# Patient Record
Sex: FEMALE | Race: WHITE | NOT HISPANIC OR LATINO | Employment: OTHER | URBAN - METROPOLITAN AREA
[De-identification: names, ages, dates, MRNs, and addresses within clinical notes are randomized per-mention and may not be internally consistent; named-entity substitution may affect disease eponyms.]

---

## 2018-03-19 ENCOUNTER — OFFICE VISIT CONVERTED (OUTPATIENT)
Dept: ORTHOPEDIC SURGERY | Facility: CLINIC | Age: 73
End: 2018-03-19
Attending: PHYSICIAN ASSISTANT

## 2018-04-23 ENCOUNTER — OFFICE VISIT CONVERTED (OUTPATIENT)
Dept: ORTHOPEDIC SURGERY | Facility: CLINIC | Age: 73
End: 2018-04-23
Attending: PHYSICIAN ASSISTANT

## 2018-05-30 ENCOUNTER — OFFICE VISIT CONVERTED (OUTPATIENT)
Dept: ORTHOPEDIC SURGERY | Facility: CLINIC | Age: 73
End: 2018-05-30
Attending: PHYSICIAN ASSISTANT

## 2018-08-15 ENCOUNTER — OFFICE VISIT CONVERTED (OUTPATIENT)
Dept: ORTHOPEDIC SURGERY | Facility: CLINIC | Age: 73
End: 2018-08-15
Attending: PHYSICIAN ASSISTANT

## 2018-09-17 ENCOUNTER — OFFICE VISIT CONVERTED (OUTPATIENT)
Dept: ORTHOPEDIC SURGERY | Facility: CLINIC | Age: 73
End: 2018-09-17
Attending: PHYSICIAN ASSISTANT

## 2019-06-03 ENCOUNTER — OFFICE VISIT CONVERTED (OUTPATIENT)
Dept: ORTHOPEDIC SURGERY | Facility: CLINIC | Age: 74
End: 2019-06-03
Attending: PHYSICIAN ASSISTANT

## 2019-07-15 ENCOUNTER — OFFICE VISIT CONVERTED (OUTPATIENT)
Dept: ORTHOPEDIC SURGERY | Facility: CLINIC | Age: 74
End: 2019-07-15
Attending: PHYSICIAN ASSISTANT

## 2021-05-15 VITALS — WEIGHT: 204 LBS | HEART RATE: 66 BPM | BODY MASS INDEX: 37.54 KG/M2 | HEIGHT: 62 IN | OXYGEN SATURATION: 96 %

## 2021-05-15 VITALS — OXYGEN SATURATION: 96 % | HEIGHT: 62 IN | HEART RATE: 65 BPM | WEIGHT: 203.25 LBS | BODY MASS INDEX: 37.4 KG/M2

## 2021-05-16 VITALS — BODY MASS INDEX: 36.99 KG/M2 | WEIGHT: 201 LBS | HEIGHT: 62 IN | OXYGEN SATURATION: 98 % | HEART RATE: 77 BPM

## 2021-05-16 VITALS — HEART RATE: 86 BPM | BODY MASS INDEX: 36.99 KG/M2 | HEIGHT: 62 IN | WEIGHT: 201 LBS | OXYGEN SATURATION: 97 %

## 2021-05-16 VITALS — HEIGHT: 61 IN | WEIGHT: 205 LBS | BODY MASS INDEX: 38.71 KG/M2 | OXYGEN SATURATION: 98 % | HEART RATE: 86 BPM

## 2021-05-16 VITALS — OXYGEN SATURATION: 97 % | HEART RATE: 80 BPM | HEIGHT: 61 IN | WEIGHT: 204.5 LBS | BODY MASS INDEX: 38.61 KG/M2

## 2021-05-16 VITALS — BODY MASS INDEX: 37.36 KG/M2 | HEIGHT: 62 IN | OXYGEN SATURATION: 98 % | WEIGHT: 203 LBS | HEART RATE: 76 BPM

## 2021-09-01 ENCOUNTER — OFFICE VISIT (OUTPATIENT)
Dept: ORTHOPEDIC SURGERY | Facility: CLINIC | Age: 76
End: 2021-09-01

## 2021-09-01 VITALS — HEIGHT: 62 IN | BODY MASS INDEX: 38.53 KG/M2 | OXYGEN SATURATION: 97 % | HEART RATE: 68 BPM | WEIGHT: 209.4 LBS

## 2021-09-01 DIAGNOSIS — M70.51 PES ANSERINUS BURSITIS OF RIGHT KNEE: Primary | ICD-10-CM

## 2021-09-01 DIAGNOSIS — Z96.652 HISTORY OF TOTAL LEFT KNEE REPLACEMENT: ICD-10-CM

## 2021-09-01 DIAGNOSIS — Z96.651 HISTORY OF TOTAL RIGHT KNEE REPLACEMENT: ICD-10-CM

## 2021-09-01 PROCEDURE — 99213 OFFICE O/P EST LOW 20 MIN: CPT | Performed by: ORTHOPAEDIC SURGERY

## 2021-09-01 RX ORDER — DICLOFENAC SODIUM 75 MG/1
75 TABLET, DELAYED RELEASE ORAL EVERY 12 HOURS
COMMUNITY
Start: 2021-08-28 | End: 2023-02-06

## 2021-09-01 RX ORDER — TIZANIDINE 4 MG/1
4 TABLET ORAL EVERY 6 HOURS PRN
COMMUNITY
Start: 2021-08-26 | End: 2023-02-06

## 2021-09-01 RX ORDER — LEVOTHYROXINE SODIUM 75 UG/1
75 TABLET ORAL DAILY
COMMUNITY
Start: 2021-08-26 | End: 2023-02-06

## 2021-09-01 RX ORDER — LEVOTHYROXINE SODIUM 0.05 MG/1
TABLET ORAL
COMMUNITY

## 2021-09-01 RX ORDER — AMLODIPINE BESYLATE 10 MG/1
10 TABLET ORAL DAILY
COMMUNITY
Start: 2021-08-27

## 2021-09-01 RX ORDER — TRIAMCINOLONE ACETONIDE 1 MG/G
CREAM TOPICAL
COMMUNITY
Start: 2021-07-27 | End: 2023-02-06

## 2022-04-06 ENCOUNTER — HOSPITAL ENCOUNTER (OUTPATIENT)
Dept: GENERAL RADIOLOGY | Facility: HOSPITAL | Age: 77
Discharge: HOME | End: 2022-04-06

## 2022-04-06 ENCOUNTER — TRANSCRIBE ORDERS (OUTPATIENT)
Dept: GENERAL RADIOLOGY | Facility: HOSPITAL | Age: 77
End: 2022-04-06

## 2022-04-06 DIAGNOSIS — M25.532 LEFT WRIST PAIN: ICD-10-CM

## 2022-04-06 DIAGNOSIS — M79.642 LEFT HAND PAIN: Primary | ICD-10-CM

## 2022-04-06 DIAGNOSIS — M79.642 LEFT HAND PAIN: ICD-10-CM

## 2022-04-06 PROCEDURE — 73110 X-RAY EXAM OF WRIST: CPT

## 2022-04-06 PROCEDURE — 73130 X-RAY EXAM OF HAND: CPT

## 2023-01-08 ENCOUNTER — HOSPITAL ENCOUNTER (OUTPATIENT)
Dept: OTHER | Facility: HOSPITAL | Age: 78
Discharge: HOME | End: 2023-01-08

## 2023-02-06 ENCOUNTER — OFFICE VISIT (OUTPATIENT)
Dept: UROLOGY | Facility: CLINIC | Age: 78
End: 2023-02-06
Payer: MEDICARE

## 2023-02-06 VITALS — HEIGHT: 62 IN | WEIGHT: 272.6 LBS | BODY MASS INDEX: 50.16 KG/M2

## 2023-02-06 DIAGNOSIS — N20.0 KIDNEY STONE: Primary | ICD-10-CM

## 2023-02-06 LAB
BILIRUB BLD-MCNC: NEGATIVE MG/DL
CLARITY, POC: CLEAR
COLOR UR: YELLOW
EXPIRATION DATE: ABNORMAL
GLUCOSE UR STRIP-MCNC: NEGATIVE MG/DL
KETONES UR QL: NEGATIVE
LEUKOCYTE EST, POC: ABNORMAL
Lab: ABNORMAL
NITRITE UR-MCNC: NEGATIVE MG/ML
PH UR: 7 [PH] (ref 5–8)
PROT UR STRIP-MCNC: ABNORMAL MG/DL
RBC # UR STRIP: ABNORMAL /UL
SP GR UR: 1.02 (ref 1–1.03)
UROBILINOGEN UR QL: ABNORMAL

## 2023-02-06 PROCEDURE — 99203 OFFICE O/P NEW LOW 30 MIN: CPT | Performed by: UROLOGY

## 2023-02-06 PROCEDURE — 81003 URINALYSIS AUTO W/O SCOPE: CPT | Performed by: UROLOGY

## 2023-02-06 NOTE — PROGRESS NOTES
"Chief Complaint  Kidney stone    Subjective          Roopa Danielle presents to [facility]OLOG[facility]  History of Present Illness     The patient reports that she was experiencing pain when she was in the emergency room and also 2 days later. The patient notes she was told that she had an infection. She denies knowing when she pass any kidney stones. She states that when Dr. Coleman * [place] was there, he told her that she had one about the size of a grain of sand. She notes that she was experiencing severe pain, however; it subsided and she came to be seen in clinic. She denies being aware of passing any kidney stones.       Objective   Vital Signs:   Ht 157.5 cm (62\")   Wt 124 kg (272 lb 9.6 oz)   BMI 49.86 kg/m²       Physical Exam  Vitals and nursing note reviewed.   Constitutional:       Appearance: Normal appearance. She is well-developed.   Pulmonary:      Effort: Pulmonary effort is normal.      Breath sounds: Normal air entry.   Neurological:      Mental Status: She is alert and oriented to person, place, and time.      Motor: Motor function is intact.   Psychiatric:         Mood and Affect: Mood normal.         Behavior: Behavior normal.          Result Review :   The following data was reviewed by: Abena Hernandez MD on 02/06/2023:    Results for orders placed or performed in visit on 02/06/23   POC Urinalysis Dipstick, Automated    Specimen: Urine   Result Value Ref Range    Color Yellow Yellow, Straw, Dark Yellow, Anali    Clarity, UA Clear Clear    Specific Gravity  1.020 1.005 - 1.030    pH, Urine 7.0 5.0 - 8.0    Leukocytes Small (1+) (A) Negative    Nitrite, UA Negative Negative    Protein,  mg/dL (A) Negative mg/dL    Glucose, UA Negative Negative mg/dL    Ketones, UA Negative Negative    Urobilinogen, UA 0.2 E.U./dL Normal, 0.2 E.U./dL    Bilirubin Negative Negative    Blood, UA Moderate (A) Negative    Lot Number [id number]     Expiration Date 22,024          Data reviewed: Radiologic " studies CT scan from [facility] ED:   CT Abdomen Pelvis wo Contrast   Final Result     No acute intra-abdominal abnormalities identified.   Nonobstructing 5 mm stone in the lower pole of the left kidney. Tiny, 2 mm nonobstructing stone in the lower pole of the right kidney. No ureteral stone or renal obstruction.     Electronically Signed:   Donovan Vanessa MD   2023/01/08 at 23:46 CST   Reading Location ID and State: [id number] / [place]   Tel [phone], Service support [phone], Fax [phone]            Assessment and Plan    Diagnoses and all orders for this visit:    1. Kidney stone (Primary)  Assessment & Plan:  We will see her back with a KUB in 1 year or sooner if needed. She has two small stones, one in each kidney, a 5 mm and a 2 mm. No treatment needed at this time.      Orders:  -     POC Urinalysis Dipstick, Automated  -     XR Abdomen KUB; Future          Follow Up       No follow-ups on file.  Patient was given instructions and counseling regarding her condition or for health maintenance advice. Please see specific information pulled into the AVS if appropriate.     Transcribed from ambient dictation for Abena Hernandez MD by Shara Mcguire.  02/06/23   13:17 EST    Patient or patient representative verbalized consent to the visit recording.  I have personally performed the services described in this document as transcribed by the above individual, and it is both accurate and complete.  Abena Hernandez MD  2/7/2023  15:58 EST

## 2023-02-06 NOTE — ASSESSMENT & PLAN NOTE
We will see her back with a KUB in 1 year or sooner if needed. She has two small stones, one in each kidney, a 5 mm and a 2 mm. No treatment needed at this time.

## 2023-02-08 ENCOUNTER — PATIENT ROUNDING (BHMG ONLY) (OUTPATIENT)
Dept: SURGERY | Facility: CLINIC | Age: 78
End: 2023-02-08
Payer: MEDICARE

## 2023-02-08 NOTE — PROGRESS NOTES
February 8, 2023    Hello, may I speak with Roopa Danielle?    My name is Stephanie Yancey      I am  with [facility] JENNIFER CHAPA  [facility]  [address]  [place] [address] [phone].    Before we get started may I verify your date of birth? 1945    I am calling to officially welcome you to our practice and ask about your recent visit. Is this a good time to talk? yes    Tell me about your visit with us. What things went well?  Patient stated her appointment went well. She stated the staff and provider were all very nice.       We're always looking for ways to make our patients' experiences even better. Do you have recommendations on ways we may improve?  no    Overall were you satisfied with your first visit to our practice? yes       I appreciate you taking the time to speak with me today. Is there anything else I can do for you? no      Thank you, and have a great day.

## 2023-05-09 ENCOUNTER — HOSPITAL ENCOUNTER (OUTPATIENT)
Dept: GENERAL RADIOLOGY | Facility: HOSPITAL | Age: 78
Discharge: HOME | End: 2023-05-09
Payer: MEDICARE

## 2023-05-09 ENCOUNTER — TRANSCRIBE ORDERS (OUTPATIENT)
Dept: GENERAL RADIOLOGY | Facility: HOSPITAL | Age: 78
End: 2023-05-09
Payer: MEDICARE

## 2023-05-09 DIAGNOSIS — M25.561 RIGHT KNEE PAIN: ICD-10-CM

## 2023-05-09 DIAGNOSIS — M25.561 ACUTE PAIN OF RIGHT KNEE: ICD-10-CM

## 2023-05-09 DIAGNOSIS — M25.561 ACUTE PAIN OF RIGHT KNEE: Primary | ICD-10-CM

## 2023-05-09 PROCEDURE — 73560 X-RAY EXAM OF KNEE 1 OR 2: CPT

## 2023-05-10 ENCOUNTER — OFFICE VISIT (OUTPATIENT)
Dept: ORTHOPEDIC SURGERY | Facility: CLINIC | Age: 78
End: 2023-05-10
Payer: MEDICARE

## 2023-05-10 VITALS — BODY MASS INDEX: 50.05 KG/M2 | HEIGHT: 62 IN | WEIGHT: 272 LBS

## 2023-05-10 DIAGNOSIS — Z96.651 HISTORY OF TOTAL RIGHT KNEE REPLACEMENT: ICD-10-CM

## 2023-05-10 DIAGNOSIS — M25.561 RIGHT KNEE PAIN, UNSPECIFIED CHRONICITY: Primary | ICD-10-CM

## 2023-05-10 RX ORDER — LEVOTHYROXINE SODIUM 0.07 MG/1
1 TABLET ORAL DAILY
COMMUNITY
Start: 2023-03-28

## 2023-05-10 RX ORDER — MONTELUKAST SODIUM 10 MG/1
1 TABLET ORAL DAILY
COMMUNITY
Start: 2023-02-08

## 2023-05-10 RX ORDER — MELOXICAM 15 MG/1
15 TABLET ORAL DAILY
Qty: 30 TABLET | Refills: 0 | Status: SHIPPED | OUTPATIENT
Start: 2023-05-10

## 2023-05-10 RX ORDER — METHOCARBAMOL 500 MG/1
1 TABLET, FILM COATED ORAL EVERY 6 HOURS
COMMUNITY
Start: 2023-03-29

## 2023-05-10 RX ORDER — METHYLPREDNISOLONE 4 MG/1
1 TABLET ORAL DAILY
Qty: 21 TABLET | Refills: 0 | Status: SHIPPED | OUTPATIENT
Start: 2023-05-10

## 2023-05-10 RX ORDER — CHOLECALCIFEROL (VITAMIN D3) 1250 MCG
1 CAPSULE ORAL WEEKLY
COMMUNITY
Start: 2023-04-05

## 2023-05-10 RX ORDER — FELODIPINE 10 MG/1
1 TABLET, EXTENDED RELEASE ORAL DAILY
COMMUNITY
Start: 2023-05-05

## 2023-05-10 RX ORDER — CYCLOBENZAPRINE HCL 10 MG
1 TABLET ORAL 3 TIMES DAILY
COMMUNITY
Start: 2023-03-28

## 2023-05-10 RX ORDER — OMEPRAZOLE 40 MG/1
1 CAPSULE, DELAYED RELEASE ORAL DAILY
COMMUNITY
Start: 2023-02-08

## 2023-05-10 RX ORDER — LOSARTAN POTASSIUM 50 MG/1
1 TABLET ORAL DAILY
COMMUNITY
Start: 2023-03-28

## 2023-05-10 NOTE — PROGRESS NOTES
"Chief Complaint  Initial Evaluation of the Right Knee     Subjective      Roopa Danielle presents to [facility] for initial evaluation of the right knee. She has had pain for about 2 weeks.  She states she was walking on uneven ground and felt her knee give way a couple times.  She is here to make sure there is no damage to her old right total knee arthroplasty.      Allergies   Allergen Reactions   • Ibuprofen Unknown - Low Severity        Social History     Socioeconomic History   • Marital status:    Tobacco Use   • Smoking status: Never   • Smokeless tobacco: Never   Vaping Use   • Vaping Use: Never used   Substance and Sexual Activity   • Alcohol use: Never   • Drug use: Never        Review of Systems     Objective   Vital Signs:   Ht 157.5 cm (62\")   Wt 123 kg (272 lb)   BMI 49.75 kg/m²       Physical Exam  Constitutional:       Appearance: Normal appearance. Patient is well-developed and normal weight.   HENT:      Head: Normocephalic.      Right Ear: Hearing and external ear normal.      Left Ear: Hearing and external ear normal.      Nose: Nose normal.   Eyes:      Conjunctiva/sclera: Conjunctivae normal.   Cardiovascular:      Rate and Rhythm: Normal rate.   Pulmonary:      Effort: Pulmonary effort is normal.      Breath sounds: No wheezing or rales.   Abdominal:      Palpations: Abdomen is soft.      Tenderness: There is no abdominal tenderness.   Musculoskeletal:      Cervical back: Normal range of motion.   Skin:     Findings: No rash.   Neurological:      Mental Status: Patient  is alert and oriented to person, place, and time.   Psychiatric:         Mood and Affect: Mood and affect normal.         Judgment: Judgment normal.       Ortho Exam      RIGHT KNEE Flexion 120. Extension 0. Stable to varus/valgus stress. Stable to anterior/posterior drawer. Neurovascularly intact. Negative Polly. Negative Lachman. Positive EHL, FHL, HS and TA. Sensation intact to light " touch all 5 nerves of the foot. Ambulates with Antalgic gait. Patella is well tracking. Calf supple, non-tender. Positive tenderness to the medial joint line. Positive tenderness to the lateral joint line. Negative Crepitus. Good strength to hamstrings, quadriceps, dorsiflexors, and plantar flexors.  Knee Extensor Mechanism intact  No joint swelling.          Procedures        Imaging Results (Most Recent)     None           Result Review :       XR Knee 1 or 2 View Right    Result Date: 5/9/2023  Narrative: PROCEDURE: XR KNEE 1 OR 2 VW RIGHT  COMPARISON: [facility] , , XR KNEE 3 VW RIGHT, 9/01/2021, 9:52.  INDICATIONS: RIGHT KNEE PAIN FOR 2 WEEKS. NO INJURY. REPLACED IN 2018.  FINDINGS:  There are postoperative changes of right total knee arthroplasty.  The prosthesis remains in appropriate position and alignment.  The adjacent bony structures are unremarkable.      Impression:   1. Stable appearance following right total knee arthroplasty.  No acute findings.      Travis Nicole MD       Electronically Signed and Approved By: Travis Nicole MD on 5/09/2023 at 14:12             XR Knee Standing Right    Result Date: 5/9/2023  Narrative: PROCEDURE: XR KNEE STANDING RIGHT  COMPARISON: [facility], , XR KNEE 1 OR 2 VW RIGHT, 5/09/2023, 13:25.  INDICATIONS: RIGHT KNEE PAIN FOR 2 WEEKS. NO INJURY. REPLACED IN 2018.  FINDINGS:  A single weight-bearing view is obtained and compared directly to the radiographs obtained earlier on 5/9/2023.  The alignment remains normal.      Impression:  Normal alignment with weight-bearing      Travis Nicole MD       Electronically Signed and Approved By: Travis Nicole MD on 5/09/2023 at 14:12                      Assessment and Plan     Diagnoses and all orders for this visit:    1. Right knee pain, unspecified chronicity (Primary)  -     methylPREDNISolone (MEDROL) 4 MG dose pack; Take 1 tablet by mouth Daily. Use as directed by package instructions   Dispense: 21 tablet; Refill: 0  -     meloxicam (Mobic) 15 MG tablet; Take 1 tablet by mouth Daily.  Dispense: 30 tablet; Refill: 0    2. History of total right knee replacement        Discussed the treatment plan with the patient. I reviewed the X-rays that were obtained 5/9/23 with the patient.     Prescribed steroid dose pack        Call or return if worsening symptoms.    Follow Up     3 weeks.       Patient was given instructions and counseling regarding her condition or for health maintenance advice. Please see specific information pulled into the AVS if appropriate.     Scribed for Carlos A Hu MD by uLzma Page MA.  05/10/23   11:13 EDT      I have personally performed the services described in this document as scribed by the above individual and it is both accurate and complete. Carlos A Hu MD 05/12/23   Answers for HPI/ROS submitted by the patient on 5/8/2023  Please describe your symptoms.: Right knee paln  Have you had these symptoms before?: Yes  How long have you been having these symptoms?: 1-2 weeks  Please list any medications you are currently taking for this condition.: None  Please describe any probable cause for these symptoms. : Dont know  What is the primary reason for your visit?: Other

## 2023-06-05 ENCOUNTER — OFFICE VISIT (OUTPATIENT)
Dept: ORTHOPEDIC SURGERY | Facility: CLINIC | Age: 78
End: 2023-06-05
Payer: MEDICARE

## 2023-06-05 VITALS — HEIGHT: 62 IN | BODY MASS INDEX: 50.05 KG/M2 | WEIGHT: 272 LBS

## 2023-06-05 DIAGNOSIS — Z96.651 HISTORY OF TOTAL RIGHT KNEE REPLACEMENT: Primary | ICD-10-CM

## 2023-06-05 NOTE — PROGRESS NOTES
"Chief Complaint  Pain and Follow-up of the Right Knee    Subjective      Roopa Danielle presents to [facility] for follow-up of right knee.  Patient has history of right total knee arthroplasty performed on 3/6/2018 by Dr. Hu.  She was recently seen for reevaluation of her knee on 5/10/2023 with complaint of increased pain to her right knee.  X-rays revealed intact hardware.  She received a prescription for Medrol Dosepak.  Patient presents today independently ambulatory without use of assistive device.  She reports that her right knee \"still hurts, but is getting a little bit better\".  She reports aching to her knee, particularly at nighttime.    Objective   Allergies   Allergen Reactions    Ibuprofen Unknown - Low Severity       Vital Signs:   Ht 157.5 cm (62\")   Wt 123 kg (272 lb)   BMI 49.75 kg/m²       Physical Exam    Constitutional: Awake, alert. Well nourished appearance.    Integumentary: Warm, dry, intact. No obvious rashes.    HENT: Atraumatic, normocephalic.   Respiratory: Non labored respirations .   Cardiovascular: Intact peripheral pulses.    Psychiatric: Normal mood and affect. A&O X3    Ortho Exam  Right knee: Skin is warm, dry, and intact.  Well-healed surgical scar noted.  No edema.  Patella is well tracking and knee is stable to varus and valgus stress.  Full knee extension and flexion to 115 degrees.  Full plantarflexion and dorsiflexion of the ankle.  Sensation intact light touch.  Distal neurovascular intact.  Patient is fully weightbearing with mildly antalgic gait.    Imaging Results (Most Recent)       None                    Assessment and Plan   Problem List Items Addressed This Visit    None  Visit Diagnoses       History of total right knee replacement    -  Primary          Follow Up   Return in about 1 year (around 6/5/2024).    Tobacco Use: Low Risk     Smoking Tobacco Use: Never    Smokeless Tobacco Use: Never    Passive Exposure: Not on file "     Patient is a non-smoker.  Did not discuss tobacco cessation options.    Patient Instructions   Rx for voltaren gel sent to pharmacy.     Discussed PT vs home exercises. Patient declines PT at this time. She will call should she change her mind.  Home exercises provided in office today.    Follow up annually for R TKA with repeat x-rays. Call with changes or concerns.   Patient was given instructions and counseling regarding her condition or for health maintenance advice. Please see specific information pulled into the AVS if appropriate.

## 2023-06-05 NOTE — PATIENT INSTRUCTIONS
Rx for voltaren gel sent to pharmacy.     Discussed PT vs home exercises. Patient declines PT at this time. She will call should she change her mind.  Home exercises provided in office today.    Follow up annually for R TKA with repeat x-rays. Call with changes or concerns.

## 2023-06-12 ENCOUNTER — OFFICE VISIT (OUTPATIENT)
Dept: ORTHOPEDIC SURGERY | Facility: CLINIC | Age: 78
End: 2023-06-12
Payer: MEDICARE

## 2023-06-12 VITALS
DIASTOLIC BLOOD PRESSURE: 78 MMHG | BODY MASS INDEX: 37.21 KG/M2 | SYSTOLIC BLOOD PRESSURE: 147 MMHG | HEART RATE: 72 BPM | HEIGHT: 63 IN | OXYGEN SATURATION: 95 % | WEIGHT: 210 LBS

## 2023-06-12 DIAGNOSIS — M25.552 BILATERAL HIP PAIN: Primary | ICD-10-CM

## 2023-06-12 DIAGNOSIS — M76.899 HIP FLEXOR TENDONITIS, UNSPECIFIED LATERALITY: ICD-10-CM

## 2023-06-12 DIAGNOSIS — M25.551 BILATERAL HIP PAIN: Primary | ICD-10-CM

## 2023-06-12 NOTE — PROGRESS NOTES
"Chief Complaint  Initial Evaluation of the Right Hip and Initial Evaluation of the Left Hip     Subjective      Roopa Danielle presents to [facility] for initial evaluation of bilateral hips.  She states the left is worse then the right.  She has had pain for a couple of weeks.  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  She has had no recent injury.      Allergies   Allergen Reactions    Ibuprofen Unknown - Low Severity        Social History     Socioeconomic History    Marital status:    Tobacco Use    Smoking status: Never    Smokeless tobacco: Never   Vaping Use    Vaping Use: Never used   Substance and Sexual Activity    Alcohol use: Never    Drug use: Never        Review of Systems     Objective   Vital Signs:   /78 (BP Location: Right arm, Patient Position: Sitting, Cuff Size: Large Adult)   Pulse 72   Ht 158.8 cm (62.5\")   Wt 95.3 kg (210 lb)   SpO2 95%   BMI 37.80 kg/m²       Physical Exam  Constitutional:       Appearance: Normal appearance. Patient is well-developed and normal weight.   HENT:      Head: Normocephalic.      Right Ear: Hearing and external ear normal.      Left Ear: Hearing and external ear normal.      Nose: Nose normal.   Eyes:      Conjunctiva/sclera: Conjunctivae normal.   Cardiovascular:      Rate and Rhythm: Normal rate.   Pulmonary:      Effort: Pulmonary effort is normal.      Breath sounds: No wheezing or rales.   Abdominal:      Palpations: Abdomen is soft.      Tenderness: There is no abdominal tenderness.   Musculoskeletal:      Cervical back: Normal range of motion.   Skin:     Findings: No rash.   Neurological:      Mental Status: Patient  is alert and oriented to person, place, and time.   Psychiatric:         Mood and Affect: Mood and affect normal.         Judgment: Judgment normal.       Ortho Exam      BILATERAL HIPS  No skin discoloration, atrophy or " swelling. Positive EHL, FHL, GS, and TA. Sensation intact to all 5 nerves of the foot. Positive straight leg raise. Leg lengths appear equal. Ambulates with Antalgic gait Negative Danyelle. Negative Jono. Good strength to hamstrings, quadriceps, dorsiflexors, and plantar flexors. Knee Extensor Mechanism  intact       Procedures        Imaging Results (Most Recent)       Procedure Component Value Units Date/Time    XR Hips Bilateral With or Without Pelvis 3-4 View [[id number]] Resulted: 06/12/23 1002     Updated: 06/12/23 1007             Result Review :     X-Ray Report:  Bilateral  hip X-Ray  Indication: Evaluation of bilateral hips.   AP/Lateral view(s)  Findings: Mild arthritis.  No fracture or dislocation.   Prior studies available for comparison: no        Assessment and Plan     Diagnoses and all orders for this visit:    1. Bilateral hip pain (Primary)  -     XR Hips Bilateral With or Without Pelvis 3-4 View    2. Hip flexor tendonitis, unspecified laterality        Discussed the treatment plan with the patient. I reviewed the X-rays that were obtained today with the patient.     Prescribed physical therapy.       Call or return if worsening symptoms.    Follow Up     PRN      Patient was given instructions and counseling regarding her condition or for health maintenance advice. Please see specific information pulled into the AVS if appropriate.     Scribed for Carlos A Hu MD by Luzma Page MA.  06/12/23   10:12 EDT    I have personally performed the services described in this document as scribed by the above individual and it is both accurate and complete. Carlos A Hu MD 06/13/23

## 2024-01-05 ENCOUNTER — LAB (OUTPATIENT)
Dept: LAB | Facility: HOSPITAL | Age: 79
End: 2024-01-05
Payer: MEDICARE

## 2024-01-05 ENCOUNTER — OFFICE VISIT (OUTPATIENT)
Dept: FAMILY MEDICINE CLINIC | Facility: CLINIC | Age: 79
End: 2024-01-05
Payer: MEDICARE

## 2024-01-05 DIAGNOSIS — Z76.89 ENCOUNTER TO ESTABLISH CARE: Primary | ICD-10-CM

## 2024-01-05 DIAGNOSIS — Z11.59 NEED FOR HEPATITIS C SCREENING TEST: ICD-10-CM

## 2024-01-05 DIAGNOSIS — E03.9 ACQUIRED HYPOTHYROIDISM: ICD-10-CM

## 2024-01-05 DIAGNOSIS — Z78.0 POSTMENOPAUSE: ICD-10-CM

## 2024-01-05 DIAGNOSIS — E66.9 OBESITY (BMI 30-39.9): ICD-10-CM

## 2024-01-05 DIAGNOSIS — I10 PRIMARY HYPERTENSION: ICD-10-CM

## 2024-01-05 DIAGNOSIS — R73.01 IMPAIRED FASTING BLOOD SUGAR: ICD-10-CM

## 2024-01-05 DIAGNOSIS — Z76.89 ENCOUNTER TO ESTABLISH CARE: ICD-10-CM

## 2024-01-05 LAB
ALBUMIN SERPL-MCNC: 4.5 G/DL (ref 3.5–5.2)
ALBUMIN/GLOB SERPL: 1.5 G/DL
ALP SERPL-CCNC: 74 U/L (ref 39–117)
ALT SERPL W P-5'-P-CCNC: 7 U/L (ref 1–33)
ANION GAP SERPL CALCULATED.3IONS-SCNC: 10.7 MMOL/L (ref 5–15)
AST SERPL-CCNC: 18 U/L (ref 1–32)
BASOPHILS # BLD AUTO: 0.07 10*3/MM3 (ref 0–0.2)
BASOPHILS NFR BLD AUTO: 0.6 % (ref 0–1.5)
BILIRUB SERPL-MCNC: 0.3 MG/DL (ref 0–1.2)
BUN SERPL-MCNC: 18 MG/DL (ref 8–23)
BUN/CREAT SERPL: 17.8 (ref 7–25)
CALCIUM SPEC-SCNC: 9.9 MG/DL (ref 8.6–10.5)
CHLORIDE SERPL-SCNC: 101 MMOL/L (ref 98–107)
CHOLEST SERPL-MCNC: 191 MG/DL (ref 0–200)
CO2 SERPL-SCNC: 27.3 MMOL/L (ref 22–29)
CREAT SERPL-MCNC: 1.01 MG/DL (ref 0.57–1)
DEPRECATED RDW RBC AUTO: 42 FL (ref 37–54)
EGFRCR SERPLBLD CKD-EPI 2021: 57.5 ML/MIN/1.73
EOSINOPHIL # BLD AUTO: 0.11 10*3/MM3 (ref 0–0.4)
EOSINOPHIL NFR BLD AUTO: 1 % (ref 0.3–6.2)
ERYTHROCYTE [DISTWIDTH] IN BLOOD BY AUTOMATED COUNT: 13.4 % (ref 12.3–15.4)
FOLATE SERPL-MCNC: 13.1 NG/ML (ref 4.78–24.2)
GLOBULIN UR ELPH-MCNC: 3.1 GM/DL
GLUCOSE SERPL-MCNC: 90 MG/DL (ref 65–99)
HBA1C MFR BLD: 5.9 % (ref 4.8–5.6)
HCT VFR BLD AUTO: 42.1 % (ref 34–46.6)
HCV AB SER DONR QL: NORMAL
HDLC SERPL-MCNC: 70 MG/DL (ref 40–60)
HGB BLD-MCNC: 13.9 G/DL (ref 12–15.9)
IMM GRANULOCYTES # BLD AUTO: 0.02 10*3/MM3 (ref 0–0.05)
IMM GRANULOCYTES NFR BLD AUTO: 0.2 % (ref 0–0.5)
LDLC SERPL CALC-MCNC: 109 MG/DL (ref 0–100)
LDLC/HDLC SERPL: 1.55 {RATIO}
LYMPHOCYTES # BLD AUTO: 3.21 10*3/MM3 (ref 0.7–3.1)
LYMPHOCYTES NFR BLD AUTO: 29 % (ref 19.6–45.3)
MCH RBC QN AUTO: 28.5 PG (ref 26.6–33)
MCHC RBC AUTO-ENTMCNC: 33 G/DL (ref 31.5–35.7)
MCV RBC AUTO: 86.3 FL (ref 79–97)
MONOCYTES # BLD AUTO: 1 10*3/MM3 (ref 0.1–0.9)
MONOCYTES NFR BLD AUTO: 9 % (ref 5–12)
NEUTROPHILS NFR BLD AUTO: 6.66 10*3/MM3 (ref 1.7–7)
NEUTROPHILS NFR BLD AUTO: 60.2 % (ref 42.7–76)
NRBC BLD AUTO-RTO: 0 /100 WBC (ref 0–0.2)
PLATELET # BLD AUTO: 307 10*3/MM3 (ref 140–450)
PMV BLD AUTO: 9.7 FL (ref 6–12)
POTASSIUM SERPL-SCNC: 4.1 MMOL/L (ref 3.5–5.2)
PROT SERPL-MCNC: 7.6 G/DL (ref 6–8.5)
RBC # BLD AUTO: 4.88 10*6/MM3 (ref 3.77–5.28)
SODIUM SERPL-SCNC: 139 MMOL/L (ref 136–145)
TRIGL SERPL-MCNC: 62 MG/DL (ref 0–150)
TSH SERPL DL<=0.05 MIU/L-ACNC: 3.44 UIU/ML (ref 0.27–4.2)
VIT B12 BLD-MCNC: 660 PG/ML (ref 211–946)
VLDLC SERPL-MCNC: 12 MG/DL (ref 5–40)
WBC NRBC COR # BLD AUTO: 11.07 10*3/MM3 (ref 3.4–10.8)

## 2024-01-05 PROCEDURE — 84443 ASSAY THYROID STIM HORMONE: CPT

## 2024-01-05 PROCEDURE — 82746 ASSAY OF FOLIC ACID SERUM: CPT

## 2024-01-05 PROCEDURE — 86803 HEPATITIS C AB TEST: CPT

## 2024-01-05 PROCEDURE — 80061 LIPID PANEL: CPT

## 2024-01-05 PROCEDURE — 36415 COLL VENOUS BLD VENIPUNCTURE: CPT

## 2024-01-05 PROCEDURE — 83036 HEMOGLOBIN GLYCOSYLATED A1C: CPT

## 2024-01-05 PROCEDURE — 85025 COMPLETE CBC W/AUTO DIFF WBC: CPT

## 2024-01-05 PROCEDURE — 80053 COMPREHEN METABOLIC PANEL: CPT

## 2024-01-05 PROCEDURE — 82607 VITAMIN B-12: CPT

## 2024-01-05 RX ORDER — AMLODIPINE BESYLATE 10 MG/1
10 TABLET ORAL DAILY
Qty: 90 TABLET | Refills: 1 | Status: SHIPPED | OUTPATIENT
Start: 2024-01-05

## 2024-01-05 RX ORDER — LEVOTHYROXINE SODIUM 0.07 MG/1
75 TABLET ORAL DAILY
Qty: 90 TABLET | Refills: 1 | Status: SHIPPED | OUTPATIENT
Start: 2024-01-05

## 2024-01-05 NOTE — PROGRESS NOTES
Chief Complaint  Establish Care and Hypothyroidism    Subjective         Roopa Danielle is a 78 y.o. female who presents to [facility]    78 years old comes to the clinic today to establish care and management of chronic conditions/medications.    Patient does not want to do any preventive care, agrees to do on the DEXA scan.    Hypertension; noncompliant with amlodipine 10 mg.  Patient did not take amlodipine this morning, does remember taking it yesterday morning.  Noncompliant with DASH diet    Obesity; patient has been trying somewhat but not successful    Hypothyroidism; compliant with levothyroxine 75 mcg    Does report chronic osteoarthritis    12+ review of systems are unremarkable otherwise    Objective   Vital Signs:   There were no vitals filed for this visit.   There is no height or weight on file to calculate BMI.   Wt Readings from Last 3 Encounters:   06/12/23 95.3 kg (210 lb)   06/05/23 123 kg (272 lb)   05/10/23 123 kg (272 lb)      BP Readings from Last 3 Encounters:   06/12/23 147/78        Patient Care Team:  Lisha Watkins MD as PCP - General (Family Medicine)  Abena Hernandez MD as Consulting Physician (Urology)     Physical Exam  Vitals reviewed.   Constitutional:       Appearance: Normal appearance. She is well-developed.   HENT:      Head: Normocephalic and atraumatic.      Right Ear: External ear normal.      Left Ear: External ear normal.      Mouth/Throat:      Pharynx: No oropharyngeal exudate.   Eyes:      Conjunctiva/sclera: Conjunctivae normal.      Pupils: Pupils are equal, round, and reactive to light.   Cardiovascular:      Rate and Rhythm: Normal rate and regular rhythm.      Heart sounds: No murmur heard.     No friction rub. No gallop.   Pulmonary:      Effort: Pulmonary effort is normal.      Breath sounds: Normal breath sounds. No wheezing or rhonchi.   Abdominal:      General: Bowel sounds are normal. There is no distension.      Palpations:  Abdomen is soft.      Tenderness: There is no abdominal tenderness.   Skin:     General: Skin is warm and dry.   Neurological:      Mental Status: She is alert and oriented to person, place, and time.      Cranial Nerves: No cranial nerve deficit.   Psychiatric:         Mood and Affect: Mood and affect normal.         Behavior: Behavior normal.         Thought Content: Thought content normal.         Judgment: Judgment normal.                              Assessment and Plan   Diagnoses and all orders for this visit:    1. Encounter to establish care (Primary)  -     TSH Rfx On Abnormal To Free T4; Future  -     CBC & Differential; Future  -     Comprehensive Metabolic Panel; Future  -     Hemoglobin A1c; Future  -     Lipid Panel; Future  -     Urinalysis With Microscopic - Urine, Clean Catch; Future  -     Vitamin B12; Future  -     Folate; Future    2. Primary hypertension  Comments:  Compliance with amlodipine 10 mg and 2 weeks home logs requested.  DASH diet recommended  Orders:  -     TSH Rfx On Abnormal To Free T4; Future  -     CBC & Differential; Future  -     Comprehensive Metabolic Panel; Future  -     Hemoglobin A1c; Future  -     Lipid Panel; Future  -     Urinalysis With Microscopic - Urine, Clean Catch; Future  -     Vitamin B12; Future  -     Folate; Future  -     amLODIPine (NORVASC) 10 MG tablet; Take 1 tablet by mouth Daily.  Dispense: 90 tablet; Refill: 1    3. Acquired hypothyroidism  Comments:  Chronic, continue with levothyroxine 75 mcg, new blood work needed.  Orders:  -     TSH Rfx On Abnormal To Free T4; Future  -     CBC & Differential; Future  -     Comprehensive Metabolic Panel; Future  -     Hemoglobin A1c; Future  -     Lipid Panel; Future  -     Urinalysis With Microscopic - Urine, Clean Catch; Future  -     Vitamin B12; Future  -     Folate; Future  -     levothyroxine (SYNTHROID, LEVOTHROID) 75 MCG tablet; Take 1 tablet by mouth Daily.  Dispense: 90 tablet; Refill: 1    4.  Postmenopause  -     Hemoglobin A1c; Future  -     DEXA Bone Density Axial; Future    5. Impaired fasting blood sugar  -     Hemoglobin A1c; Future    6. Need for hepatitis C screening test  -     Hepatitis C Antibody; Future    7. Obesity (BMI 30-39.9)  Comments:  Daily exercise and healthy diet recommended      All the preventive cares reviewed, patient declined all the preventive cares except DEXA scan.        Tobacco Use: Low Risk  (1/5/2024)    Patient History     Smoking Tobacco Use: Never     Smokeless Tobacco Use: Never     Passive Exposure: Not on file            Follow Up   Return in about 3 months (around 4/5/2024) for Medicare Wellness.  Patient was given instructions and counseling regarding her condition or for health maintenance advice. Please see specific information pulled into the AVS if appropriate.

## 2024-02-06 ENCOUNTER — HOSPITAL ENCOUNTER (OUTPATIENT)
Dept: BONE DENSITY | Facility: HOSPITAL | Age: 79
Discharge: HOME | End: 2024-02-06
Admitting: STUDENT IN AN ORGANIZED HEALTH CARE EDUCATION/TRAINING PROGRAM
Payer: MEDICARE

## 2024-02-06 DIAGNOSIS — Z78.0 POSTMENOPAUSE: ICD-10-CM

## 2024-02-06 PROCEDURE — 77080 DXA BONE DENSITY AXIAL: CPT

## 2024-04-11 ENCOUNTER — HOSPITAL ENCOUNTER (OUTPATIENT)
Dept: GENERAL RADIOLOGY | Facility: HOSPITAL | Age: 79
Discharge: HOME | End: 2024-04-11
Payer: MEDICARE

## 2024-04-11 ENCOUNTER — OFFICE VISIT (OUTPATIENT)
Dept: FAMILY MEDICINE CLINIC | Facility: CLINIC | Age: 79
End: 2024-04-11
Payer: MEDICARE

## 2024-04-11 VITALS
TEMPERATURE: 97.1 F | WEIGHT: 211 LBS | RESPIRATION RATE: 16 BRPM | DIASTOLIC BLOOD PRESSURE: 62 MMHG | SYSTOLIC BLOOD PRESSURE: 112 MMHG | BODY MASS INDEX: 38.83 KG/M2 | OXYGEN SATURATION: 96 % | HEART RATE: 86 BPM | HEIGHT: 62 IN

## 2024-04-11 DIAGNOSIS — R05.2 SUBACUTE COUGH: ICD-10-CM

## 2024-04-11 DIAGNOSIS — Z00.00 MEDICARE ANNUAL WELLNESS VISIT, SUBSEQUENT: Primary | ICD-10-CM

## 2024-04-11 DIAGNOSIS — I10 PRIMARY HYPERTENSION: ICD-10-CM

## 2024-04-11 PROCEDURE — 71046 X-RAY EXAM CHEST 2 VIEWS: CPT

## 2024-04-11 RX ORDER — AZITHROMYCIN 250 MG/1
TABLET, FILM COATED ORAL
Qty: 6 TABLET | Refills: 0 | Status: SHIPPED | OUTPATIENT
Start: 2024-04-11

## 2024-04-11 RX ORDER — BROMPHENIRAMINE MALEATE, PSEUDOEPHEDRINE HYDROCHLORIDE, AND DEXTROMETHORPHAN HYDROBROMIDE 2; 30; 10 MG/5ML; MG/5ML; MG/5ML
7 SYRUP ORAL 4 TIMES DAILY PRN
Qty: 118 ML | Refills: 0 | Status: SHIPPED | OUTPATIENT
Start: 2024-04-11

## 2024-04-11 RX ORDER — PREDNISONE 20 MG/1
20 TABLET ORAL 2 TIMES DAILY
Qty: 10 TABLET | Refills: 0 | Status: SHIPPED | OUTPATIENT
Start: 2024-04-11

## 2024-04-11 NOTE — PROGRESS NOTES
The ABCs of the Annual Wellness Visit  Subsequent Medicare Wellness Visit    Subjective    Roopa Danielle is a 78 y.o. female who presents for a Subsequent Medicare Wellness Visit.    The following portions of the patient's history were reviewed and   updated as appropriate: allergies, current medications, past family history, past medical history, past social history, past surgical history, and problem list.    Compared to one year ago, the patient feels her physical   health is the same.    Compared to one year ago, the patient feels her mental   health is the same.    Recent Hospitalizations:  She was not admitted to the hospital during the last year.       Current Medical Providers:  Patient Care Team:  Lisha Watkins MD as PCP - General (Family Medicine)  Abena Hernandez MD as Consulting Physician (Urology)    Outpatient Medications Prior to Visit   Medication Sig Dispense Refill    amLODIPine (NORVASC) 10 MG tablet Take 1 tablet by mouth Daily. 90 tablet 1    levothyroxine (SYNTHROID, LEVOTHROID) 75 MCG tablet Take 1 tablet by mouth Daily. 90 tablet 1    methocarbamol (ROBAXIN) 500 MG tablet Take 1 tablet by mouth As Needed.       No facility-administered medications prior to visit.       No opioid medication identified on active medication list. I have reviewed chart for other potential  high risk medication/s and harmful drug interactions in the elderly.        Aspirin is not on active medication list.  Aspirin use is not indicated based on review of current medical condition/s. Risk of harm outweighs potential benefits.  .    Patient Active Problem List   Diagnosis    Kidney stone     Advance Care Planning   Advance Care Planning     Advance Directive is not on file.  ACP discussion was held with the patient during this visit. Patient does not have an advance directive, information provided.     Objective    Vitals:    04/11/24 1258   BP: 112/62   Pulse: 86   Resp: 16   Temp: 97.1 °F (36.2 °C)   SpO2: 96%  "  Weight: 95.7 kg (211 lb)   Height: 157.5 cm (62\")   PainSc: 0-No pain     Estimated body mass index is 38.59 kg/m² as calculated from the following:    Height as of this encounter: 157.5 cm (62\").    Weight as of this encounter: 95.7 kg (211 lb).           Does the patient have evidence of cognitive impairment? No          HEALTH RISK ASSESSMENT    Smoking Status:  Social History     Tobacco Use   Smoking Status Never    Passive exposure: Past   Smokeless Tobacco Never   Tobacco Comments    Used to be exposed to smokers     Alcohol Consumption:  Social History     Substance and Sexual Activity   Alcohol Use Never     Fall Risk Screen:    STEADI Fall Risk Assessment was completed, and patient is at LOW risk for falls.Assessment completed on:2024    Depression Screenin/11/2024     1:07 PM   PHQ-2/PHQ-9 Depression Screening   Little Interest or Pleasure in Doing Things 0-->not at all   Feeling Down, Depressed or Hopeless 0-->not at all   PHQ-9: Brief Depression Severity Measure Score 0       Health Habits and Functional and Cognitive Screenin/11/2024     1:04 PM   Functional & Cognitive Status   Do you have difficulty preparing food and eating? No   Do you have difficulty bathing yourself, getting dressed or grooming yourself? No   Do you have difficulty using the toilet? No   Do you have difficulty moving around from place to place? Yes   Do you have trouble with steps or getting out of a bed or a chair? Yes   Current Diet Unhealthy Diet   Dental Exam Up to date   Eye Exam Up to date   Exercise (times per week) 0 times per week   Current Exercises Include Other   Do you need help using the phone?  No   Are you deaf or do you have serious difficulty hearing?  No   Do you need help to go to places out of walking distance? No   Do you need help shopping? No   Do you need help preparing meals?  No   Do you need help with housework?  No   Do you need help with laundry? No   Do you need help taking " your medications? No   Do you need help managing money? No   Do you ever drive or ride in a car without wearing a seat belt? No   Have you felt unusual stress, anger or loneliness in the last month? No   Who do you live with? Spouse   If you need help, do you have trouble finding someone available to you? No   Have you been bothered in the last four weeks by sexual problems? No   Do you have difficulty concentrating, remembering or making decisions? No       Age-appropriate Screening Schedule:  Refer to the list below for future screening recommendations based on patient's age, sex and/or medical conditions. Orders for these recommended tests are listed in the plan section. The patient has been provided with a written plan.    Health Maintenance   Topic Date Due    ZOSTER VACCINE (1 of 2) 04/11/2024 (Originally 10/16/1996)    COVID-19 Vaccine (3 - 2023-24 season) 04/13/2024 (Originally 9/1/2023)    Pneumococcal Vaccine 65+ (1 of 1 - PCV) 06/15/2024 (Originally 10/16/2011)    TDAP/TD VACCINES (1 - Tdap) 01/05/2025 (Originally 10/16/1965)    RSV Vaccine - Adults (1 - 1-dose 60+ series) 04/11/2025 (Originally 10/16/2006)    BMI FOLLOWUP  06/12/2024    INFLUENZA VACCINE  08/01/2024    ANNUAL WELLNESS VISIT  04/11/2025    DXA SCAN  02/06/2026    HEPATITIS C SCREENING  Completed                  CMS Preventative Services Quick Reference  Risk Factors Identified During Encounter  Immunizations Discussed/Encouraged: Tdap, Prevnar 20 (Pneumococcal 20-valent conjugate), Shingrix, and RSV (Respiratory Syncytial Virus)  Dental Screening Recommended  Vision Screening Recommended  The above risks/problems have been discussed with the patient.  Pertinent information has been shared with the patient in the After Visit Summary.  An After Visit Summary and PPPS were made available to the patient.    Follow Up:   Next Medicare Wellness visit to be scheduled in 1 year.       Additional E&M Note during same encounter follows:  Patient  "has multiple medical problems which are significant and separately identifiable that require additional work above and beyond the Medicare Wellness Visit.      Chief Complaint  Medicare Wellness-subsequent    Subjective        HPI  Roopa Danielle is also being seen today for cough and congestion for 3 months.    Patient reports that coughing started after bronchitis about 3 months ago, since then she has been having some coughing on and off specially every morning.  Denies any fever/chest pain or shortness of breath, physically active otherwise.  Non-smoker.         Objective   Vital Signs:  /62   Pulse 86   Temp 97.1 °F (36.2 °C)   Resp 16   Ht 157.5 cm (62\")   Wt 95.7 kg (211 lb)   SpO2 96%   BMI 38.59 kg/m²     Physical Exam  Vitals reviewed.   Constitutional:       Appearance: Normal appearance. She is well-developed.   HENT:      Head: Normocephalic and atraumatic.      Right Ear: External ear normal.      Left Ear: External ear normal.      Mouth/Throat:      Pharynx: No oropharyngeal exudate.   Eyes:      Conjunctiva/sclera: Conjunctivae normal.      Pupils: Pupils are equal, round, and reactive to light.   Cardiovascular:      Rate and Rhythm: Normal rate and regular rhythm.      Heart sounds: No murmur heard.     No friction rub. No gallop.   Pulmonary:      Effort: Pulmonary effort is normal.      Breath sounds: Normal breath sounds. No wheezing or rhonchi.   Abdominal:      General: Bowel sounds are normal. There is no distension.      Palpations: Abdomen is soft.      Tenderness: There is no abdominal tenderness.   Skin:     General: Skin is warm and dry.   Neurological:      Mental Status: She is alert and oriented to person, place, and time.      Cranial Nerves: No cranial nerve deficit.   Psychiatric:         Mood and Affect: Mood and affect normal.         Behavior: Behavior normal.         Thought Content: Thought content normal.         Judgment: Judgment normal.               "   Assessment and Plan   Diagnoses and all orders for this visit:    1. Medicare annual wellness visit, subsequent (Primary)    2. Primary hypertension    3. Subacute cough  Comments:  Chest x-ray, empiric treatment.  Follow-up if still not improved  Orders:  -     XR Chest PA & Lateral; Future  -     azithromycin (Zithromax Z-Brendan) 250 MG tablet; Take 2 tablets by mouth on day 1, then 1 tablet daily on days 2-5  Dispense: 6 tablet; Refill: 0  -     predniSONE (DELTASONE) 20 MG tablet; Take 1 tablet by mouth 2 (Two) Times a Day.  Dispense: 10 tablet; Refill: 0  -     brompheniramine-pseudoephedrine-DM 30-2-10 MG/5ML syrup; Take 7 mL by mouth 4 (Four) Times a Day As Needed for Cough or Congestion.  Dispense: 118 mL; Refill: 0             Follow Up   Return in about 6 months (around 10/11/2024) for Next scheduled follow up.  Patient was given instructions and counseling regarding her condition or for health maintenance advice. Please see specific information pulled into the AVS if appropriate.

## 2024-06-06 DIAGNOSIS — M67.432 GANGLION CYST OF VOLAR ASPECT OF LEFT WRIST: Primary | ICD-10-CM

## 2024-06-06 DIAGNOSIS — M67.40 GANGLION CYST: Primary | ICD-10-CM

## 2024-07-03 DIAGNOSIS — I10 PRIMARY HYPERTENSION: ICD-10-CM

## 2024-07-03 RX ORDER — AMLODIPINE BESYLATE 10 MG/1
10 TABLET ORAL DAILY
Qty: 90 TABLET | Refills: 0 | Status: SHIPPED | OUTPATIENT
Start: 2024-07-03

## 2024-07-08 ENCOUNTER — OFFICE VISIT (OUTPATIENT)
Dept: ORTHOPEDIC SURGERY | Facility: CLINIC | Age: 79
End: 2024-07-08
Payer: MEDICARE

## 2024-07-08 VITALS
HEART RATE: 68 BPM | DIASTOLIC BLOOD PRESSURE: 78 MMHG | BODY MASS INDEX: 38.83 KG/M2 | WEIGHT: 211 LBS | OXYGEN SATURATION: 95 % | SYSTOLIC BLOOD PRESSURE: 138 MMHG | HEIGHT: 62 IN

## 2024-07-08 DIAGNOSIS — M19.042 OSTEOARTHRITIS OF LEFT HAND, UNSPECIFIED OSTEOARTHRITIS TYPE: ICD-10-CM

## 2024-07-08 DIAGNOSIS — R20.2 NUMBNESS AND TINGLING IN LEFT HAND: ICD-10-CM

## 2024-07-08 DIAGNOSIS — R20.0 NUMBNESS AND TINGLING IN LEFT HAND: ICD-10-CM

## 2024-07-08 DIAGNOSIS — M25.532 LEFT WRIST PAIN: Primary | ICD-10-CM

## 2024-07-08 PROCEDURE — 99213 OFFICE O/P EST LOW 20 MIN: CPT | Performed by: ORTHOPAEDIC SURGERY

## 2024-07-08 RX ORDER — NAPROXEN 500 MG/1
500 TABLET ORAL 2 TIMES DAILY
Qty: 60 TABLET | Refills: 1 | Status: SHIPPED | OUTPATIENT
Start: 2024-07-08

## 2024-07-08 NOTE — PROGRESS NOTES
"Chief Complaint  Initial Evaluation of the Left Wrist     Subjective      Roopa Danielle presents to [facility] for initial evaluation of the left wrist. She has pain in the left wrist and a Cyst on her wrist.  She has had the pain for awhile and the cyst and has just gotten worse.  She has numbness of the left fingers.  She has difficulty with  and FMC tasks.     Allergies   Allergen Reactions    Ibuprofen Unknown - Low Severity        Social History     Socioeconomic History    Marital status:    Tobacco Use    Smoking status: Never     Passive exposure: Past    Smokeless tobacco: Never    Tobacco comments:     Used to be exposed to smokers   Vaping Use    Vaping status: Never Used   Substance and Sexual Activity    Alcohol use: Never    Drug use: Never    Sexual activity: Defer     Partners: Male        I reviewed the patient's chief complaint, history of present illness, review of systems, past medical history, surgical history, family history, social history, medications, and allergy list.     Review of Systems     Constitutional: Denies fevers, chills, weight loss  Cardiovascular: Denies chest pain, shortness of breath  Skin: Denies rashes, acute skin changes  Neurologic: Denies headache, loss of consciousness  MSK: Left wrist      Vital Signs:   /78   Pulse 68   Ht 157.5 cm (62\")   Wt 95.7 kg (211 lb)   SpO2 95%   BMI 38.59 kg/m²          Physical Exam  General: Alert. No acute distress    Ortho Exam        Left wrist Positive Compression testing/ Positive Tinels. NegativeFinkelsteins. Negative Galeas's testing. Positive CMC grind testing. Positive Phalens. Full ROM of the hand, fingers, elbow and wrist. Negative Triggering of the digit. Sensation grossly intact to light touch, median, radial and ulnar nerve. Positive AIN, PIN and ulnar nerve motor function intact. Axillary nerve intact. Positive pulses. Mild swelling.        Procedures    X-Ray Report:  Left " wrist  X-Ray  Indication: Evaluation of the left wrist  AP/Lateral view(s)  Findings: Advanced degenerative bone on bone arthritis.   Prior studies available for comparison: no       Imaging Results (Most Recent)       Procedure Component Value Units Date/Time    XR Wrist 2 View Left [[id number]] Resulted: 07/08/24 1052     Updated: 07/08/24 1056             Result Review :             Assessment and Plan     Diagnoses and all orders for this visit:    1. Left wrist pain (Primary)  -     XR Wrist 2 View Left    2. Numbness and tingling in left hand  -     EMG & Nerve Conduction Test; Future    3. Osteoarthritis of left hand, unspecified osteoarthritis type        Discussed the treatment plan with the patient. I reviewed the X-rays that were obtained today with the patient.     EMG of the left upper extremity. Prescribed anti inflammatory    Discussed referral to hand specialist for arthritis.  She has two different issues going on.  Discussed injections of the left wrist for arthritis.     Call or return if worsening symptoms.    Follow Up     After EMG of the left wrist.       Patient was given instructions and counseling regarding her condition or for health maintenance advice. Please see specific information pulled into the AVS if appropriate.     Scribed for Carlos A Hu MD by Luzma Page MA.  07/08/24   10:46 EDT      I have personally performed the services described in this document as scribed by the above individual and it is both accurate and complete. Carlos A Hu MD 07/08/24

## 2024-08-08 ENCOUNTER — HOSPITAL ENCOUNTER (OUTPATIENT)
Facility: HOSPITAL | Age: 79
Discharge: HOME | End: 2024-08-08
Payer: MEDICARE

## 2024-08-08 DIAGNOSIS — R20.2 NUMBNESS AND TINGLING IN LEFT HAND: ICD-10-CM

## 2024-08-08 DIAGNOSIS — R20.0 NUMBNESS AND TINGLING IN LEFT HAND: ICD-10-CM

## 2024-08-08 PROCEDURE — 95886 MUSC TEST DONE W/N TEST COMP: CPT

## 2024-08-08 PROCEDURE — 95909 NRV CNDJ TST 5-6 STUDIES: CPT

## 2024-08-14 ENCOUNTER — OFFICE VISIT (OUTPATIENT)
Dept: ORTHOPEDIC SURGERY | Facility: CLINIC | Age: 79
End: 2024-08-14
Payer: MEDICARE

## 2024-08-14 ENCOUNTER — PREP FOR SURGERY (OUTPATIENT)
Dept: OTHER | Facility: HOSPITAL | Age: 79
End: 2024-08-14
Payer: MEDICARE

## 2024-08-14 VITALS
SYSTOLIC BLOOD PRESSURE: 152 MMHG | BODY MASS INDEX: 38.83 KG/M2 | HEART RATE: 68 BPM | HEIGHT: 62 IN | WEIGHT: 211 LBS | DIASTOLIC BLOOD PRESSURE: 85 MMHG | OXYGEN SATURATION: 93 %

## 2024-08-14 DIAGNOSIS — M65.312 TRIGGER THUMB OF LEFT HAND: ICD-10-CM

## 2024-08-14 DIAGNOSIS — G56.02 CARPAL TUNNEL SYNDROME ON LEFT: Primary | ICD-10-CM

## 2024-08-14 NOTE — PROGRESS NOTES
"Chief Complaint  Follow-up of the Left Hand     Subjective      Roopa Danielle presents to [facility] for follow up of the left hand. She had an EMG and is here to review. She has had the pain for awhile and the cyst and has just gotten worse.  She has numbness of the left fingers.  She has difficulty with  and FMC tasks.        on the releleft hand and left carpal tunnel.     Allergies   Allergen Reactions    Ibuprofen Unknown - Low Severity        Social History     Socioeconomic History    Marital status:    Tobacco Use    Smoking status: Never     Passive exposure: Past    Smokeless tobacco: Never    Tobacco comments:     Used to be exposed to smokers   Vaping Use    Vaping status: Never Used   Substance and Sexual Activity    Alcohol use: Never    Drug use: Never    Sexual activity: Defer     Partners: Male        I reviewed the patient's chief complaint, history of present illness, review of systems, past medical history, surgical history, family history, social history, medications, and allergy list.     Review of Systems     Constitutional: Denies fevers, chills, weight loss  Cardiovascular: Denies chest pain, shortness of breath  Skin: Denies rashes, acute skin changes  Neurologic: Denies headache, loss of consciousness      Vital Signs:   /85   Pulse 68   Ht 157.5 cm (62\")   Wt 95.7 kg (211 lb)   SpO2 93%   BMI 38.59 kg/m²          Physical Exam  General: Alert. No acute distress    Ortho Exam        Left wrist Positive Compression testing/ Positive Tinels. NegativeFinkelsteins. Negative Galeas's testing. Positive CMC grind testing. Positive Phalens. Full ROM of the hand, fingers, elbow and wrist. Positive Triggering of the 1 st digit. Sensation grossly intact to light touch, median, radial and ulnar nerve. Positive AIN, PIN and ulnar nerve motor function intact. Axillary nerve intact. Positive pulses. Mild swelling.  Thenar atrophy noted.    "       Procedures        Imaging Results (Most Recent)       None             Result Review :        IMPRESSION: 1. Evidence of a very severe, demyelinating and axonal loss, focal neuropathy of the left median nerve at the wrist (carpal tunnel). 2. No electrophysiologic evidence of a left cervical radiculopathy, left brachial plexopathy, additional focal neuropathy, polyneuropathic changes, myopathy, or motor neuron pathology    EMG & Nerve Conduction Test    Result Date: 8/8/2024  Narrative: See attached EMG/NCS report. Electronically signed by Adrian Head PT, 08/08/24, 2:22 PM EDT.             Assessment and Plan     Diagnoses and all orders for this visit:    1. Carpal tunnel syndrome on left (Primary)    2. Trigger thumb of left hand        Discussed the treatment plan with the patient. I reviewed the EMG with the patient.  The patient expressed understanding and wished to proceed with a left carpal runnel release and a left trigger thumb release.       Discussed surgery., Risks/benefits discussed with patient including, but not limited to: infection, bleeding, neurovascular damage, re-rupture, aesthetic deformity, need for further surgery, and death., Surgery pamphlet given., and Call or return if worsening symptoms.    Follow Up     2 weeks postoperatively       Patient was given instructions and counseling regarding her condition or for health maintenance advice. Please see specific information pulled into the AVS if appropriate.     Scribed for Carlos A Hu MD by Luzma Page MA.  08/14/24   10:33 EDT      I have personally performed the services described in this document as scribed by the above individual and it is both accurate and complete. Carlos A Hu MD 08/15/24

## 2024-08-21 NOTE — PRE-PROCEDURE INSTRUCTIONS
PATIENT INSTRUCTED TO BE:    - NOTHING TO EAT AFTER MIDNIGHT OR CHEW, EXCEPT CAN HAVE CLEAR LIQUIDS 2 HOURS PRIOR TO SURGERY ARRIVAL TIME , NO MORE THAN 8 OZ. (NOTHING RED)     - TO HOLD ALL VITAMINS, SUPPLEMENTS, NSAIDS FOR ONE WEEK PRIOR TO THEIR SURGICAL PROCEDURE         - BATHING INSTRUCTIONS GIVEN    INSTRUCTED NO LOTIONS, JEWELRY, PIERCINGS,  NAIL POLISH, OR DEODORANT DAY OF SURGERY        -INSTRUCTED TO TAKE THE FOLLOWING MEDICATIONS THE DAY OF SURGERY WITH SIPS OF WATER:   Amlodipine, Levothyroxine        - DO NOT BRING ANY MEDICATIONS WITH YOU TO THE HOSPITAL THE DAY OF SURGERY, EXCEPT IF USE INHALERS. BRING INHALERS DAY OF SURGERY       - BRING CPAP OR BIPAP TO THE HOSPITAL ONLY IF YOU ARE SPENDING THE NIGHT    - DO NOT SMOKE OR VAPE 24 HOURS PRIOR TO PROCEDURE PER ANESTHESIA REQUEST     -MAKE SURE YOU HAVE A RIDE HOME OR SOMEONE TO STAY WITH YOU THE DAY OF THE PROCEDURE AFTER YOU GO HOME     - FOLLOW ANY OTHER INSTRUCTIONS GIVEN TO YOU BY YOUR SURGEON'S OFFICE.      COME TO [facility], FIRST FLOOR. CHECK IN AT THE DESK FOR REGISTRATION/SURGERY    - YOU WILL RECEIVE A PHONE CALL THE DAY PRIOR TO SURGERY BETWEEN 1PM AND 4 PM WITH ARRIVAL TIME, IF YOUR SURGERY IS ON A MONDAY YOU WILL RECEIVE A CALL THE FRIDAY PRIOR TO SURGERY DATE    - BRING CASH OR CREDIT CARD FOR COPAYMENT OF MEDICATIONS AFTER SURGERY IF YOU USE THE HOSPITAL PHARMACY (MEDS TO BED)    - PREADMISSION TESTING NURSE RANJIT SANTOS [phone] IF HAVE ANY QUESTIONS     -PATIENT PROVIDED THE NUMBER FOR PREOP SURGICAL DEPT IF HAD QUESTIONS AFTER HOURS PRIOR TO SURGERY [phone]).  INFORMED PT IF NO ANSWER, LEAVE A MESSAGE AND SOMEONE WILL RETURN THEIR CALL       PATIENT VERBALIZED UNDERSTANDING       Clear Liquid Diet        Find out when you need to start a clear liquid diet.   Think of “clear liquids” as anything you could read a newspaper through. This includes things like water, broth, sports drinks, or tea WITHOUT any kind of  milk or cream.           Once you are told to start a clear liquid diet, only drink these things until 2 hours before arrival to the hospital or when the hospital says to stop. Total volume limitation: 8 oz.       Clear liquids you CAN drink:   Water   Clear broth: beef, chicken, vegetable, or bone broth with nothing in it   Gatorade   Lemonade or Axel-aid   Soda   Tea, coffee (NO cream or honey)   Jell-O (without fruit)   Popsicles (without fruit or cream)   Italian ices   Juice without pulp: apple, white, grape   You may use salt, pepper, and sugar  NO RED  NO NOODLES    Do NOT drink:   Milk or cream   Soy milk, almond milk, coconut milk, or other non-dairy drinks and   creamers   Milkshakes or smoothies   Tomato juice   Orange juice   Grapefruit juice   Cream soups or any other than broth         Clear Liquid Diet:  Do NOT eat any solid food.  Do NOT eat or suck on mints or candy.  Do NOT chew gum.  Do NOT drink thick liquids like milk or juice with pulp in it.  Do NOT add milk, cream, or anything like soy milk or almond milk to coffee or tea.

## 2024-08-22 ENCOUNTER — ANESTHESIA EVENT (OUTPATIENT)
Dept: PERIOP | Facility: HOSPITAL | Age: 79
End: 2024-08-22
Payer: MEDICARE

## 2024-08-22 ENCOUNTER — HOSPITAL ENCOUNTER (OUTPATIENT)
Facility: HOSPITAL | Age: 79
Discharge: HOME | End: 2024-08-22
Attending: ORTHOPAEDIC SURGERY | Admitting: ORTHOPAEDIC SURGERY
Payer: MEDICARE

## 2024-08-22 ENCOUNTER — ANESTHESIA (OUTPATIENT)
Dept: PERIOP | Facility: HOSPITAL | Age: 79
End: 2024-08-22
Payer: MEDICARE

## 2024-08-22 VITALS
HEART RATE: 61 BPM | RESPIRATION RATE: 16 BRPM | SYSTOLIC BLOOD PRESSURE: 159 MMHG | DIASTOLIC BLOOD PRESSURE: 76 MMHG | HEIGHT: 62 IN | TEMPERATURE: 97.8 F | WEIGHT: 208.56 LBS | OXYGEN SATURATION: 95 % | BODY MASS INDEX: 38.38 KG/M2

## 2024-08-22 DIAGNOSIS — G56.02 CARPAL TUNNEL SYNDROME ON LEFT: ICD-10-CM

## 2024-08-22 DIAGNOSIS — G56.02 CARPAL TUNNEL SYNDROME OF LEFT WRIST: Primary | ICD-10-CM

## 2024-08-22 PROCEDURE — A9270 NON-COVERED ITEM OR SERVICE: HCPCS | Performed by: ANESTHESIOLOGY

## 2024-08-22 PROCEDURE — 63710000001 SCOPOLAMINE 1 MG/3DAYS PATCH 72 HOUR: Performed by: ANESTHESIOLOGY

## 2024-08-22 PROCEDURE — 26055 INCISE FINGER TENDON SHEATH: CPT | Performed by: ORTHOPAEDIC SURGERY

## 2024-08-22 PROCEDURE — 25010000002 MIDAZOLAM PER 1MG: Performed by: ANESTHESIOLOGY

## 2024-08-22 PROCEDURE — 64721 CARPAL TUNNEL SURGERY: CPT | Performed by: ORTHOPAEDIC SURGERY

## 2024-08-22 PROCEDURE — 25810000003 LACTATED RINGERS PER 1000 ML: Performed by: ANESTHESIOLOGY

## 2024-08-22 PROCEDURE — A9270 NON-COVERED ITEM OR SERVICE: HCPCS

## 2024-08-22 PROCEDURE — 63710000001 ACETAMINOPHEN EXTRA STRENGTH 500 MG TABLET: Performed by: ANESTHESIOLOGY

## 2024-08-22 PROCEDURE — 25010000002 CEFAZOLIN PER 500 MG: Performed by: ORTHOPAEDIC SURGERY

## 2024-08-22 PROCEDURE — 25010000002 FENTANYL CITRATE (PF) 50 MCG/ML SOLUTION

## 2024-08-22 PROCEDURE — S0260 H&P FOR SURGERY: HCPCS | Performed by: ORTHOPAEDIC SURGERY

## 2024-08-22 PROCEDURE — 25010000002 ONDANSETRON PER 1 MG

## 2024-08-22 PROCEDURE — 63710000001 OXYCODONE 5 MG TABLET

## 2024-08-22 PROCEDURE — 25010000002 PROPOFOL 10 MG/ML EMULSION

## 2024-08-22 PROCEDURE — 25010000002 DEXAMETHASONE PER 1 MG

## 2024-08-22 RX ORDER — PROMETHAZINE HYDROCHLORIDE 12.5 MG/1
25 TABLET ORAL ONCE AS NEEDED
Status: DISCONTINUED | OUTPATIENT
Start: 2024-08-22 | End: 2024-08-22 | Stop reason: HOSPADM

## 2024-08-22 RX ORDER — ONDANSETRON 2 MG/ML
4 INJECTION INTRAMUSCULAR; INTRAVENOUS ONCE AS NEEDED
Status: DISCONTINUED | OUTPATIENT
Start: 2024-08-22 | End: 2024-08-22 | Stop reason: HOSPADM

## 2024-08-22 RX ORDER — ONDANSETRON 2 MG/ML
INJECTION INTRAMUSCULAR; INTRAVENOUS AS NEEDED
Status: DISCONTINUED | OUTPATIENT
Start: 2024-08-22 | End: 2024-08-22 | Stop reason: SURG

## 2024-08-22 RX ORDER — MIDAZOLAM HYDROCHLORIDE 2 MG/2ML
2 INJECTION, SOLUTION INTRAMUSCULAR; INTRAVENOUS ONCE
Status: COMPLETED | OUTPATIENT
Start: 2024-08-22 | End: 2024-08-22

## 2024-08-22 RX ORDER — ACETAMINOPHEN 500 MG
1000 TABLET ORAL ONCE
Status: COMPLETED | OUTPATIENT
Start: 2024-08-22 | End: 2024-08-22

## 2024-08-22 RX ORDER — SODIUM CHLORIDE, SODIUM LACTATE, POTASSIUM CHLORIDE, CALCIUM CHLORIDE 600; 310; 30; 20 MG/100ML; MG/100ML; MG/100ML; MG/100ML
9 INJECTION, SOLUTION INTRAVENOUS CONTINUOUS PRN
Status: DISCONTINUED | OUTPATIENT
Start: 2024-08-22 | End: 2024-08-22 | Stop reason: HOSPADM

## 2024-08-22 RX ORDER — SCOLOPAMINE TRANSDERMAL SYSTEM 1 MG/1
1 PATCH, EXTENDED RELEASE TRANSDERMAL ONCE
Status: DISCONTINUED | OUTPATIENT
Start: 2024-08-22 | End: 2024-08-22 | Stop reason: HOSPADM

## 2024-08-22 RX ORDER — MEPERIDINE HYDROCHLORIDE 25 MG/ML
12.5 INJECTION INTRAMUSCULAR; INTRAVENOUS; SUBCUTANEOUS
Status: DISCONTINUED | OUTPATIENT
Start: 2024-08-22 | End: 2024-08-22 | Stop reason: HOSPADM

## 2024-08-22 RX ORDER — FENTANYL CITRATE 50 UG/ML
INJECTION, SOLUTION INTRAMUSCULAR; INTRAVENOUS AS NEEDED
Status: DISCONTINUED | OUTPATIENT
Start: 2024-08-22 | End: 2024-08-22 | Stop reason: SURG

## 2024-08-22 RX ORDER — HYDROCODONE BITARTRATE AND ACETAMINOPHEN 7.5; 325 MG/1; MG/1
1 TABLET ORAL EVERY 4 HOURS PRN
Qty: 12 TABLET | Refills: 0 | Status: SHIPPED | OUTPATIENT
Start: 2024-08-22

## 2024-08-22 RX ORDER — PROPOFOL 10 MG/ML
VIAL (ML) INTRAVENOUS AS NEEDED
Status: DISCONTINUED | OUTPATIENT
Start: 2024-08-22 | End: 2024-08-22 | Stop reason: SURG

## 2024-08-22 RX ORDER — DEXAMETHASONE SODIUM PHOSPHATE 4 MG/ML
INJECTION, SOLUTION INTRA-ARTICULAR; INTRALESIONAL; INTRAMUSCULAR; INTRAVENOUS; SOFT TISSUE AS NEEDED
Status: DISCONTINUED | OUTPATIENT
Start: 2024-08-22 | End: 2024-08-22 | Stop reason: SURG

## 2024-08-22 RX ORDER — OXYCODONE HYDROCHLORIDE 5 MG/1
5 TABLET ORAL
Status: DISCONTINUED | OUTPATIENT
Start: 2024-08-22 | End: 2024-08-22 | Stop reason: HOSPADM

## 2024-08-22 RX ORDER — PROMETHAZINE HYDROCHLORIDE 25 MG/1
25 SUPPOSITORY RECTAL ONCE AS NEEDED
Status: DISCONTINUED | OUTPATIENT
Start: 2024-08-22 | End: 2024-08-22 | Stop reason: HOSPADM

## 2024-08-22 RX ORDER — LIDOCAINE HYDROCHLORIDE 20 MG/ML
INJECTION, SOLUTION EPIDURAL; INFILTRATION; INTRACAUDAL; PERINEURAL AS NEEDED
Status: DISCONTINUED | OUTPATIENT
Start: 2024-08-22 | End: 2024-08-22 | Stop reason: SURG

## 2024-08-22 RX ADMIN — MIDAZOLAM HYDROCHLORIDE 2 MG: 1 INJECTION, SOLUTION INTRAMUSCULAR; INTRAVENOUS at 06:55

## 2024-08-22 RX ADMIN — FENTANYL CITRATE 50 MCG: 50 INJECTION, SOLUTION INTRAMUSCULAR; INTRAVENOUS at 07:29

## 2024-08-22 RX ADMIN — SCOPALAMINE 1 PATCH: 1 PATCH, EXTENDED RELEASE TRANSDERMAL at 06:37

## 2024-08-22 RX ADMIN — FENTANYL CITRATE 25 MCG: 50 INJECTION, SOLUTION INTRAMUSCULAR; INTRAVENOUS at 07:20

## 2024-08-22 RX ADMIN — SODIUM CHLORIDE, POTASSIUM CHLORIDE, SODIUM LACTATE AND CALCIUM CHLORIDE 9 ML/HR: 600; 310; 30; 20 INJECTION, SOLUTION INTRAVENOUS at 06:37

## 2024-08-22 RX ADMIN — OXYCODONE HYDROCHLORIDE 5 MG: 5 TABLET ORAL at 08:16

## 2024-08-22 RX ADMIN — FENTANYL CITRATE 25 MCG: 50 INJECTION, SOLUTION INTRAMUSCULAR; INTRAVENOUS at 07:25

## 2024-08-22 RX ADMIN — DEXAMETHASONE SODIUM PHOSPHATE 4 MG: 4 INJECTION, SOLUTION INTRAMUSCULAR; INTRAVENOUS at 07:27

## 2024-08-22 RX ADMIN — PROPOFOL 130 MG: 10 INJECTION, EMULSION INTRAVENOUS at 07:20

## 2024-08-22 RX ADMIN — SODIUM CHLORIDE 2 G: 9 INJECTION, SOLUTION INTRAVENOUS at 07:22

## 2024-08-22 RX ADMIN — LIDOCAINE HYDROCHLORIDE 60 MG: 20 INJECTION, SOLUTION INTRAVENOUS at 07:20

## 2024-08-22 RX ADMIN — ACETAMINOPHEN 1000 MG: 500 TABLET ORAL at 06:37

## 2024-08-22 RX ADMIN — ONDANSETRON HYDROCHLORIDE 4 MG: 2 SOLUTION INTRAMUSCULAR; INTRAVENOUS at 07:27

## 2024-08-22 RX ADMIN — PROPOFOL 200 MCG/KG/MIN: 10 INJECTION, EMULSION INTRAVENOUS at 07:21

## 2024-08-22 NOTE — PERIOPERATIVE NURSING NOTE
Patient arrived to recovery with an oral airway in place which was removed without difficulty at 0803

## 2024-08-22 NOTE — OP NOTE
CARPAL TUNNEL RELEASE  Procedure Report    Patient Name:  Roopa Danielle  YOB: 1945    Date of Surgery:  8/22/2024       Pre-op Diagnosis:   Carpal tunnel syndrome on left [G56.02]     Left trigger thumb  Post-Op Diagnosis Codes:     * Carpal tunnel syndrome on left [G56.02]  Left trigger thumb  Procedure/CPT® Codes:      Procedure(s):  LEFT CARPAL TUNNEL RELEASE and left TRIGGER THUMB RELEASE    Staff:  Surgeon(s):  Carlos A Hu MD    Assistant: Elvis Gibbs    Anesthesia: Choice    Estimated Blood Loss: None    Implants:    Nothing was implanted during the procedure    Specimen:          None      Complications: None    Description of Procedure:     The patient was informed of the risks and benefits.  The patient was taken to the operating room and placed supine after a Aly block anesthesia was established.  The left hand and upper extremity were prepped and draped satisfactorily using alcohol and ChloraPrep.      A standard incision was made just ulnar to the thenar crease to the thumb, approximately 1.5 cm length, carried down through subcutaneous tissue and fascia, carried down to the palmaris fascia with tenotomy scissors.  Using a 69 blade the transverse carpal ligament was released over the median nerve.  The median nerve had significant signs of compression.  A [place] elevator was used to protect the nerves during the release and after release had been completed, it was checked proximally and distally and under loupe magnification was satisfactory.  The nerve was inspected and was intact along with the motor branch.  The wound was copiously irrigated.  The skin was closed with horizontal mattress suture nylon and interrupted 3-0 horizontal mattress sutures.  Incision was made over the A1 pulley of the left thumb dissection was carried down to this pulley the pulley was released entirely and the FPL was pulled free of the pulley the thumb no longer triggered and the wound was closed  with nylon after irrigation and the incision was washed and dried and sterile dressings were applied.       Carlos A Hu MD     Date: 8/22/2024  Time: 07:45 EDT

## 2024-08-22 NOTE — H&P
"Chief Complaint  Follow-up of the Left Hand        Subjective  Roopa Danielle presents to [facility] for follow up of the left hand. She had an EMG and is here to review. She has had the pain for awhile and the cyst and has just gotten worse.  She has numbness of the left fingers.  She has difficulty with  and [facility] tasks.         on the releleft hand and left carpal tunnel.      Allergies        Allergies   Allergen Reactions    Ibuprofen Unknown - Low Severity            Social History   Social History            Socioeconomic History    Marital status:    Tobacco Use    Smoking status: Never       Passive exposure: Past    Smokeless tobacco: Never    Tobacco comments:       Used to be exposed to smokers   Vaping Use    Vaping status: Never Used   Substance and Sexual Activity    Alcohol use: Never    Drug use: Never    Sexual activity: Defer       Partners: Male            I reviewed the patient's chief complaint, history of present illness, review of systems, past medical history, surgical history, family history, social history, medications, and allergy list.      Review of Systems      Constitutional: Denies fevers, chills, weight loss  Cardiovascular: Denies chest pain, shortness of breath  Skin: Denies rashes, acute skin changes  Neurologic: Denies headache, loss of consciousness        Vital Signs:   /85   Pulse 68   Ht 157.5 cm (62\")   Wt 95.7 kg (211 lb)   SpO2 93%   BMI 38.59 kg/m²           Physical Exam  General: Alert. No acute distress     Ortho Exam          Left wrist Positive Compression testing/ Positive Tinels. NegativeFinkelsteins. Negative Galeas's testing. Positive CMC grind testing. Positive Phalens. Full ROM of the hand, fingers, elbow and wrist. Positive Triggering of the 1 st digit. Sensation grossly intact to light touch, median, radial and ulnar nerve. Positive AIN, PIN and ulnar nerve motor function intact. Axillary nerve intact. " Positive pulses. Mild swelling.  Thenar atrophy noted.            Procedures           Imaging Results (Most Recent)         None                      Result Review  :          IMPRESSION: 1. Evidence of a very severe, demyelinating and axonal loss, focal neuropathy of the left median nerve at the wrist (carpal tunnel). 2. No electrophysiologic evidence of a left cervical radiculopathy, left brachial plexopathy, additional focal neuropathy, polyneuropathic changes, myopathy, or motor neuron pathology     EMG & Nerve Conduction Test     Result Date: 8/8/2024  Narrative: See attached EMG/NCS report. Electronically signed by Adrian Head PT, 08/08/24, 2:22 PM EDT.                 Assessment  Assessment and Plan      Diagnoses and all orders for this visit:     1. Carpal tunnel syndrome on left (Primary)     2. Trigger thumb of left hand           Discussed the treatment plan with the patient. I reviewed the EMG with the patient.  The patient expressed understanding and wished to proceed with a left carpal runnel release and a left trigger thumb release.         Discussed surgery., Risks/benefits discussed with patient including, but not limited to: infection, bleeding, neurovascular damage, re-rupture, aesthetic deformity, need for further surgery, and death., Surgery pamphlet given., and Call or return if worsening symptoms.     Follow Up      2 weeks postoperatively

## 2024-08-22 NOTE — ANESTHESIA POSTPROCEDURE EVALUATION
Patient: Roopa Danielle    Procedure Summary       Date: 08/22/24 Room / Location: [facility] OR  / [facility]    Anesthesia Start: 0714 Anesthesia Stop: 0753    Procedure: LEFT CARPAL TUNNEL RELEASE and TRIGGER THUMB RELEASE (Left: Wrist) Diagnosis:       Carpal tunnel syndrome on left      (Carpal tunnel syndrome on left [G56.02])    Surgeons: Carlos A Hu MD Provider: Quinn Gross MD    Anesthesia Type: general ASA Status: 2            Anesthesia Type: general    Vitals  Vitals Value Taken Time   /70 08/22/24 0827   Temp 36.6 °C (97.8 °F) 08/22/24 0750   Pulse 64 08/22/24 0829   Resp 16 08/22/24 0825   SpO2 94 % 08/22/24 0829   Vitals shown include unfiled device data.        Post Anesthesia Care and Evaluation    Patient location during evaluation: bedside  Patient participation: complete - patient participated  Level of consciousness: awake  Pain score: 2  Pain management: adequate    Airway patency: patent  PONV Status: none  Cardiovascular status: acceptable and stable  Respiratory status: acceptable  Hydration status: acceptable

## 2024-08-22 NOTE — ANESTHESIA PREPROCEDURE EVALUATION
Anesthesia Evaluation     Patient summary reviewed and Nursing notes reviewed   history of anesthetic complications:  PONV  NPO Solid Status: > 8 hours  NPO Liquid Status: > 2 hours           Airway   Mallampati: II  TM distance: >3 FB  Neck ROM: full  No difficulty expected  Dental      Pulmonary - negative pulmonary ROS and normal exam    breath sounds clear to auscultation  Cardiovascular - normal exam  Exercise tolerance: good (4-7 METS)    Rhythm: regular  Rate: normal    (+) hypertension      Neuro/Psych  (+) numbness, psychiatric history  GI/Hepatic/Renal/Endo    (+) GERD, renal disease-, thyroid problem     Musculoskeletal     Abdominal    Substance History - negative use     OB/GYN negative ob/gyn ROS         Other   arthritis,     ROS/Med Hx Other: PAT Nursing Notes unavailable.               Anesthesia Plan    ASA 2     general     (Patient understands anesthesia not responsible for dental damage.)  intravenous induction     Anesthetic plan, risks, benefits, and alternatives have been provided, discussed and informed consent has been obtained with: patient.    Use of blood products discussed with patient .    Plan discussed with CRNA.    CODE STATUS:

## 2024-08-22 NOTE — DISCHARGE INSTRUCTIONS
DISCHARGE INSTRUCTIONS  CARPAL TUNNEL RELEASE      For your surgery you had:  General anesthesia (you may have a sore throat for the first 24 hours)  IV sedation  Local anesthesia  Monitored anesthesia care  Regional Anesthesia  You received a medication patch for nausea prevention today (behind the ear). It is recommended that you remove it 24-48 hours post-operatively. It must be removed within 72 hours.   You have received an anesthesia medication today that can cause hormonal forms of birth control to be ineffective. You should use a different form of birth control (to prevent pregnancy) for 7 days.   You may experience dizziness, drowsiness, or lightheadedness for several hours following surgery.  Do not stay alone today or tonight.  Limit your activity for 24 hours.  Resume your diet slowly.    You should not drive or operate machinery, drink alcohol, or sign legally binding documents for 24 hours or while you are taking pain medication.  If you had an axillary or regional block, you may not have control of the involved limb for up to 12 hours. Protect the arm with a sling or follow your physician's specific instructions. Carry the upper arm in a sling so that the hand and wrist are above the level of the heart. Elevate affected arm on a pillow when resting.   Use ice to affected area for 48-72 hours. Apply 20 minutes on - 20 minutes off. Do NOT apply directly to skin.  Exercise fingers frequently by making a full fist and fully straightening the fingers. This will help prevent swelling and stiffness.  Do NOT do any heavy lifting, pulling or strenuous activities using the affected hand. [] Keep splint clean and dry.  []  Discontinue ace bandage in 24 hours.  [] Remove gauze in      .  [] Do NOT submerge in water.  [] Keep incision area clean and dry.  [] You may shower or bathe in      .  NOTIFY YOUR DOCTOR IF YOU EXPERIENCE ANY OF THE FOLLOWING:  Temperature greater than 101 degrees Fahrenheit  Shaking  Chills  Redness or excessive drainage from incision  Nausea, vomiting and/or pain that is not controlled by prescribed medications  Increase in bleeding or bleeding that is excessive  Unable to urinate in 6 hours after surgery  If unable to reach your doctor, please go to the closest Emergency Room  Last dose of pain medication was given at:   .  You should see   for follow-up care   on   .  Phone number:      SPECIAL INSTRUCTIONS:               I have read and received the above instructions.

## 2024-08-23 ENCOUNTER — TELEPHONE (OUTPATIENT)
Dept: ORTHOPEDIC SURGERY | Facility: CLINIC | Age: 79
End: 2024-08-23
Payer: MEDICARE

## 2024-08-23 NOTE — TELEPHONE ENCOUNTER
Provider: DR. THORNTON    Caller: MIMI ANN    Relationship to Patient: SELF    Phone Number: [phone]    Reason for Call: PATIENT CALLED WANTING TO CHANGE THE TIME FOR HER 09/04/24 APPOINTMENT WITH [facility]. PATIENT LIVES OUT IN [place], SAYS THAT WOULD BE 7 AM HER TIME AND WANTS SOMETHING LATER ON THE SAME DAY IF POSSIBLE. PLEASE ADVISE.

## 2024-09-01 DIAGNOSIS — M19.042 OSTEOARTHRITIS OF LEFT HAND, UNSPECIFIED OSTEOARTHRITIS TYPE: ICD-10-CM

## 2024-09-03 RX ORDER — NAPROXEN 500 MG/1
500 TABLET ORAL 2 TIMES DAILY
Qty: 60 TABLET | Refills: 0 | Status: SHIPPED | OUTPATIENT
Start: 2024-09-03

## 2024-09-06 ENCOUNTER — OFFICE VISIT (OUTPATIENT)
Dept: ORTHOPEDIC SURGERY | Facility: CLINIC | Age: 79
End: 2024-09-06
Payer: MEDICARE

## 2024-09-06 VITALS — WEIGHT: 208 LBS | BODY MASS INDEX: 38.28 KG/M2 | HEIGHT: 62 IN

## 2024-09-06 DIAGNOSIS — M65.311 TRIGGER THUMB OF RIGHT HAND: Primary | ICD-10-CM

## 2024-09-06 DIAGNOSIS — Z98.890 S/P CARPAL TUNNEL RELEASE: ICD-10-CM

## 2024-09-06 DIAGNOSIS — Z98.890 S/P TRIGGER FINGER RELEASE: ICD-10-CM

## 2024-09-06 RX ORDER — LIDOCAINE HYDROCHLORIDE 10 MG/ML
1 INJECTION, SOLUTION INFILTRATION; PERINEURAL
Status: COMPLETED | OUTPATIENT
Start: 2024-09-06 | End: 2024-09-06

## 2024-09-06 RX ORDER — TRIAMCINOLONE ACETONIDE 40 MG/ML
40 INJECTION, SUSPENSION INTRA-ARTICULAR; INTRAMUSCULAR
Status: COMPLETED | OUTPATIENT
Start: 2024-09-06 | End: 2024-09-06

## 2024-09-06 RX ADMIN — TRIAMCINOLONE ACETONIDE 40 MG: 40 INJECTION, SUSPENSION INTRA-ARTICULAR; INTRAMUSCULAR at 13:30

## 2024-09-06 RX ADMIN — LIDOCAINE HYDROCHLORIDE 1 ML: 10 INJECTION, SOLUTION INFILTRATION; PERINEURAL at 13:30

## 2024-09-06 NOTE — PROGRESS NOTES
"Chief Complaint  Follow-up of the Left Wrist and Initial Evaluation of the Right Hand     Subjective      Roopa Danielle presents to [facility] for follow up of the left carpal tunnel release and left trigger thumb release on 8/14/24.  She is having numbness in the left 4 th and 5 th digit.  Her splint and sutures were removed in office today. She is having numbness and tingling of the 4 th and 5 th digits.  She is having triggering of the right thumb.        Allergies   Allergen Reactions    Ibuprofen Unknown - Low Severity        Social History     Socioeconomic History    Marital status:    Tobacco Use    Smoking status: Never     Passive exposure: Past    Smokeless tobacco: Never    Tobacco comments:     Used to be exposed to smokers   Vaping Use    Vaping status: Never Used   Substance and Sexual Activity    Alcohol use: Never    Drug use: Never    Sexual activity: Defer     Partners: Male        I reviewed the patient's chief complaint, history of present illness, review of systems, past medical history, surgical history, family history, social history, medications, and allergy list.     Review of Systems     Constitutional: Denies fevers, chills, weight loss  Cardiovascular: Denies chest pain, shortness of breath  Skin: Denies rashes, acute skin changes  Neurologic: Denies headache, loss of consciousness        Vital Signs:   Ht 157.5 cm (62\")   Wt 94.3 kg (208 lb)   BMI 38.04 kg/m²          Physical Exam  General: Alert. No acute distress    Ortho Exam        BILATERAL HANDS  Full ROM of the hand, fingers, elbow and wrist. Positive Triggering of the digit of the right thumb. Sensation grossly intact to light touch, median, radial and ulnar nerve. Positive AIN, PIN and ulnar nerve motor function intact. Axillary nerve intact. Positive pulses. Sutures removed in office today.  No evidence of wound dehiscence, surrounding erythema, warmth, or drainage.        Right Trigger " Thumb  Consent given by: patient  Site marked: site marked  Timeout: Immediately prior to procedure a time out was called to verify the correct patient, procedure, equipment, support staff and site/side marked as required   Supporting Documentation  Indications: pain   Procedure Details  Location: -   Location: Right Trigger Thumb.Preparation: Patient was prepped and draped in the usual sterile fashion  Needle gauge: 23G.  Medications administered: 1 mL lidocaine 1 %; 40 mg triamcinolone acetonide 40 MG/ML  Patient tolerance: patient tolerated the procedure well with no immediate complications      This injection documentation was Scribed for Carlos A Hu MD by Consuelo Orta.  09/06/24   14:03 EDT        Imaging Results (Most Recent)       None             Result Review :             Assessment and Plan     Diagnoses and all orders for this visit:    1. Trigger thumb of right hand (Primary)    2. S/P trigger finger release    3. S/P carpal tunnel release        Discussed the treatment plan with the patient.     Wrist brace given.     Discussed the risks and benefits of conservative measures The patient expressed understanding and wished to proceed with a right trigger thumb steroid injection.  She tolerated the injection well.       Call or return if worsening symptoms.    Follow Up     PRN      Patient was given instructions and counseling regarding her condition or for health maintenance advice. Please see specific information pulled into the AVS if appropriate.     Scribed for Carlos A Hu MD by Luzma Page MA.  09/06/24   13:39 EDT    I have personally performed the services described in this document as scribed by the above individual and it is both accurate and complete. Carlos A Hu MD 09/06/24

## 2024-09-29 DIAGNOSIS — M19.042 OSTEOARTHRITIS OF LEFT HAND, UNSPECIFIED OSTEOARTHRITIS TYPE: ICD-10-CM

## 2024-09-30 RX ORDER — NAPROXEN 500 MG/1
500 TABLET ORAL 2 TIMES DAILY
Qty: 60 TABLET | Refills: 0 | Status: SHIPPED | OUTPATIENT
Start: 2024-09-30

## 2024-10-02 DIAGNOSIS — I10 PRIMARY HYPERTENSION: ICD-10-CM

## 2024-10-02 RX ORDER — AMLODIPINE BESYLATE 10 MG/1
10 TABLET ORAL DAILY
Qty: 90 TABLET | Refills: 0 | Status: SHIPPED | OUTPATIENT
Start: 2024-10-02

## 2024-10-18 ENCOUNTER — OFFICE VISIT (OUTPATIENT)
Dept: FAMILY MEDICINE CLINIC | Facility: CLINIC | Age: 79
End: 2024-10-18
Payer: MEDICARE

## 2024-10-18 VITALS
WEIGHT: 208 LBS | TEMPERATURE: 96.9 F | SYSTOLIC BLOOD PRESSURE: 142 MMHG | BODY MASS INDEX: 38.28 KG/M2 | DIASTOLIC BLOOD PRESSURE: 70 MMHG | HEART RATE: 82 BPM | RESPIRATION RATE: 16 BRPM | OXYGEN SATURATION: 98 % | HEIGHT: 62 IN

## 2024-10-18 DIAGNOSIS — M85.80 OSTEOPENIA, UNSPECIFIED LOCATION: ICD-10-CM

## 2024-10-18 DIAGNOSIS — E66.9 OBESITY (BMI 30-39.9): ICD-10-CM

## 2024-10-18 DIAGNOSIS — E03.9 ACQUIRED HYPOTHYROIDISM: ICD-10-CM

## 2024-10-18 DIAGNOSIS — I10 PRIMARY HYPERTENSION: ICD-10-CM

## 2024-10-18 DIAGNOSIS — R73.03 PREDIABETES: Primary | ICD-10-CM

## 2024-10-18 PROCEDURE — 99214 OFFICE O/P EST MOD 30 MIN: CPT | Performed by: STUDENT IN AN ORGANIZED HEALTH CARE EDUCATION/TRAINING PROGRAM

## 2024-10-18 PROCEDURE — 1125F AMNT PAIN NOTED PAIN PRSNT: CPT | Performed by: STUDENT IN AN ORGANIZED HEALTH CARE EDUCATION/TRAINING PROGRAM

## 2024-10-18 PROCEDURE — 1159F MED LIST DOCD IN RCRD: CPT | Performed by: STUDENT IN AN ORGANIZED HEALTH CARE EDUCATION/TRAINING PROGRAM

## 2024-10-18 PROCEDURE — 1160F RVW MEDS BY RX/DR IN RCRD: CPT | Performed by: STUDENT IN AN ORGANIZED HEALTH CARE EDUCATION/TRAINING PROGRAM

## 2024-10-18 NOTE — PROGRESS NOTES
"Chief Complaint  Hypertension    Subjective         Roopa Danielle is a 79 y.o. female who presents to [facility]  79 years old comes to the clinic today to follow-up.    Hypothyroid is controlled on levothyroxine 75    Hypertension; staying controlled on amlodipine    Does have arthritis, reports no chest pain or shortness of breath on exertion.    Declined mammogram, not a smoker, DEXA scan showed osteopenia and patient does not want to take any alendronate    12+ review of systems are unremarkable otherwise    Objective   Vital Signs:   Vitals:    10/18/24 1257   BP: 142/70   Pulse: 82   Resp: 16   Temp: 96.9 °F (36.1 °C)   SpO2: 98%   Weight: 94.3 kg (208 lb)   Height: 157.5 cm (62\")   PainSc:   2      Body mass index is 38.04 kg/m².   Wt Readings from Last 3 Encounters:   10/18/24 94.3 kg (208 lb)   09/06/24 94.3 kg (208 lb)   08/22/24 94.6 kg (208 lb 8.9 oz)      BP Readings from Last 3 Encounters:   10/18/24 142/70   08/22/24 159/76   08/14/24 152/85        Patient Care Team:  Lisha Watkins MD as PCP - General (Family Medicine)  Abena Hernandez MD as Consulting Physician (Urology)     Physical Exam  Vitals reviewed.   Constitutional:       Appearance: Normal appearance. She is well-developed.   HENT:      Head: Normocephalic and atraumatic.      Right Ear: External ear normal.      Left Ear: External ear normal.      Mouth/Throat:      Pharynx: No oropharyngeal exudate.   Eyes:      Conjunctiva/sclera: Conjunctivae normal.      Pupils: Pupils are equal, round, and reactive to light.   Cardiovascular:      Rate and Rhythm: Normal rate and regular rhythm.      Heart sounds: No murmur heard.     No friction rub. No gallop.   Pulmonary:      Effort: Pulmonary effort is normal.      Breath sounds: Normal breath sounds. No wheezing or rhonchi.   Abdominal:      General: Bowel sounds are normal. There is no distension.      Palpations: Abdomen is soft.      Tenderness: There is no " abdominal tenderness.   Skin:     General: Skin is warm and dry.   Neurological:      Mental Status: She is alert and oriented to person, place, and time.      Cranial Nerves: No cranial nerve deficit.   Psychiatric:         Mood and Affect: Mood and affect normal.         Behavior: Behavior normal.         Thought Content: Thought content normal.         Judgment: Judgment normal.          Class 2 Severe Obesity (BMI >=35 and <=39.9). Obesity-related health conditions include the following: obstructive sleep apnea, hypertension, and coronary heart disease. Obesity is unchanged. BMI is is above average; BMI management plan is completed. We discussed portion control and increasing exercise.              Assessment and Plan   Diagnoses and all orders for this visit:    1. Prediabetes (Primary)  Comments:  Chronic, controlled, diet modifications discussed  Orders:  -     TSH Rfx On Abnormal To Free T4; Future  -     CBC & Differential; Future  -     Comprehensive Metabolic Panel; Future  -     Hemoglobin A1c; Future  -     Lipid Panel; Future  -     Urinalysis With Microscopic - Urine, Clean Catch; Future    2. Primary hypertension  Comments:  Continue with amlodipine 10 mg daily, DASH diet recommended  Orders:  -     TSH Rfx On Abnormal To Free T4; Future  -     CBC & Differential; Future  -     Comprehensive Metabolic Panel; Future  -     Hemoglobin A1c; Future  -     Lipid Panel; Future  -     Urinalysis With Microscopic - Urine, Clean Catch; Future    3. Obesity (BMI 30-39.9)  Comments:  Lifestyle modifications discussed  Orders:  -     TSH Rfx On Abnormal To Free T4; Future  -     CBC & Differential; Future  -     Comprehensive Metabolic Panel; Future  -     Hemoglobin A1c; Future  -     Lipid Panel; Future  -     Urinalysis With Microscopic - Urine, Clean Catch; Future    4. Acquired hypothyroidism  Comments:  Continue with levothyroxine 75 mcg  Orders:  -     TSH Rfx On Abnormal To Free T4; Future  -     CBC &  Differential; Future  -     Comprehensive Metabolic Panel; Future  -     Hemoglobin A1c; Future  -     Lipid Panel; Future  -     Urinalysis With Microscopic - Urine, Clean Catch; Future    5. Osteopenia, unspecified location  Comments:  declined alendronate. agrees to vit d and ca  Orders:  -     TSH Rfx On Abnormal To Free T4; Future  -     CBC & Differential; Future  -     Comprehensive Metabolic Panel; Future  -     Hemoglobin A1c; Future  -     Lipid Panel; Future  -     Urinalysis With Microscopic - Urine, Clean Catch; Future          Tobacco Use: Low Risk  (10/18/2024)    Patient History    • Smoking Tobacco Use: Never    • Smokeless Tobacco Use: Never    • Passive Exposure: Past            Follow Up   Return in about 6 months (around 4/18/2025) for Next scheduled follow up.  Patient was given instructions and counseling regarding her condition or for health maintenance advice. Please see specific information pulled into the AVS if appropriate.

## 2024-10-27 DIAGNOSIS — M19.042 OSTEOARTHRITIS OF LEFT HAND, UNSPECIFIED OSTEOARTHRITIS TYPE: ICD-10-CM

## 2024-10-28 RX ORDER — NAPROXEN 500 MG/1
500 TABLET ORAL 2 TIMES DAILY
Qty: 60 TABLET | Refills: 0 | Status: SHIPPED | OUTPATIENT
Start: 2024-10-28

## 2024-12-03 DIAGNOSIS — M19.042 OSTEOARTHRITIS OF LEFT HAND, UNSPECIFIED OSTEOARTHRITIS TYPE: ICD-10-CM

## 2024-12-03 RX ORDER — NAPROXEN 500 MG/1
500 TABLET ORAL 2 TIMES DAILY
Qty: 60 TABLET | Refills: 0 | Status: SHIPPED | OUTPATIENT
Start: 2024-12-03

## 2024-12-26 DIAGNOSIS — I10 PRIMARY HYPERTENSION: ICD-10-CM

## 2024-12-26 RX ORDER — AMLODIPINE BESYLATE 10 MG/1
10 TABLET ORAL DAILY
Qty: 90 TABLET | Refills: 0 | Status: SHIPPED | OUTPATIENT
Start: 2024-12-26

## 2025-01-24 DIAGNOSIS — E03.9 ACQUIRED HYPOTHYROIDISM: ICD-10-CM

## 2025-01-24 RX ORDER — LEVOTHYROXINE SODIUM 75 UG/1
75 TABLET ORAL DAILY
Qty: 90 TABLET | Refills: 1 | Status: SHIPPED | OUTPATIENT
Start: 2025-01-24

## 2025-01-24 NOTE — TELEPHONE ENCOUNTER
UPCOMING APPTS  With Family Medicine (Lisha Watkins MD)  04/18/2025 at 1:00 PM  LAST OFFICE VISIT - THIS DEPT  10/18/2024 Lisha Watkins MD

## 2025-01-24 NOTE — TELEPHONE ENCOUNTER
Caller: Roopa Danielle    Relationship: Self    Best call back number: [phone]    Requested Prescriptions:   Requested Prescriptions     Pending Prescriptions Disp Refills    levothyroxine (SYNTHROID, LEVOTHROID) 75 MCG tablet 90 tablet 1     Sig: Take 1 tablet by mouth Daily.        Pharmacy where request should be sent: [facility] [address] [place] [address] [phone] [facility] [phone]      Last office visit with prescribing clinician: 10/18/2024   Last telemedicine visit with prescribing clinician: Visit date not found   Next office visit with prescribing clinician: 4/18/2025     Additional details provided by patient:     Does the patient have less than a 3 day supply:  [x] Yes  [] No    Would you like a call back once the refill request has been completed: [] Yes [] No    If the office needs to give you a call back, can they leave a voicemail: [] Yes [] No    Edinson Grewal Rep   01/24/25 12:59 EST

## 2025-03-26 DIAGNOSIS — I10 PRIMARY HYPERTENSION: ICD-10-CM

## 2025-03-26 RX ORDER — AMLODIPINE BESYLATE 10 MG/1
10 TABLET ORAL DAILY
Qty: 90 TABLET | Refills: 0 | Status: SHIPPED | OUTPATIENT
Start: 2025-03-26

## 2025-04-08 ENCOUNTER — LAB (OUTPATIENT)
Dept: LAB | Facility: HOSPITAL | Age: 80
End: 2025-04-08
Payer: MEDICARE

## 2025-04-08 DIAGNOSIS — E66.9 OBESITY (BMI 30-39.9): ICD-10-CM

## 2025-04-08 DIAGNOSIS — M19.042 OSTEOARTHRITIS OF LEFT HAND, UNSPECIFIED OSTEOARTHRITIS TYPE: ICD-10-CM

## 2025-04-08 DIAGNOSIS — I10 PRIMARY HYPERTENSION: ICD-10-CM

## 2025-04-08 DIAGNOSIS — R73.03 PREDIABETES: ICD-10-CM

## 2025-04-08 DIAGNOSIS — E03.9 ACQUIRED HYPOTHYROIDISM: ICD-10-CM

## 2025-04-08 DIAGNOSIS — M85.80 OSTEOPENIA, UNSPECIFIED LOCATION: ICD-10-CM

## 2025-04-08 LAB
ALBUMIN SERPL-MCNC: 3.9 G/DL (ref 3.5–5.2)
ALBUMIN/GLOB SERPL: 1.1 G/DL
ALP SERPL-CCNC: 69 U/L (ref 39–117)
ALT SERPL W P-5'-P-CCNC: 10 U/L (ref 1–33)
ANION GAP SERPL CALCULATED.3IONS-SCNC: 9.6 MMOL/L (ref 5–15)
AST SERPL-CCNC: 19 U/L (ref 1–32)
BASOPHILS # BLD AUTO: 0.07 10*3/MM3 (ref 0–0.2)
BASOPHILS NFR BLD AUTO: 0.7 % (ref 0–1.5)
BILIRUB SERPL-MCNC: 0.3 MG/DL (ref 0–1.2)
BUN SERPL-MCNC: 18 MG/DL (ref 8–23)
BUN/CREAT SERPL: 15 (ref 7–25)
CALCIUM SPEC-SCNC: 9.9 MG/DL (ref 8.6–10.5)
CHLORIDE SERPL-SCNC: 102 MMOL/L (ref 98–107)
CHOLEST SERPL-MCNC: 192 MG/DL (ref 0–200)
CO2 SERPL-SCNC: 25.4 MMOL/L (ref 22–29)
CREAT SERPL-MCNC: 1.2 MG/DL (ref 0.57–1)
DEPRECATED RDW RBC AUTO: 40.4 FL (ref 37–54)
EGFRCR SERPLBLD CKD-EPI 2021: 46.4 ML/MIN/1.73
EOSINOPHIL # BLD AUTO: 0.16 10*3/MM3 (ref 0–0.4)
EOSINOPHIL NFR BLD AUTO: 1.7 % (ref 0.3–6.2)
ERYTHROCYTE [DISTWIDTH] IN BLOOD BY AUTOMATED COUNT: 12.9 % (ref 12.3–15.4)
GLOBULIN UR ELPH-MCNC: 3.6 GM/DL
GLUCOSE SERPL-MCNC: 99 MG/DL (ref 65–99)
HBA1C MFR BLD: 5.6 % (ref 4.8–5.6)
HCT VFR BLD AUTO: 41.6 % (ref 34–46.6)
HDLC SERPL-MCNC: 68 MG/DL (ref 40–60)
HGB BLD-MCNC: 14 G/DL (ref 12–15.9)
IMM GRANULOCYTES # BLD AUTO: 0.02 10*3/MM3 (ref 0–0.05)
IMM GRANULOCYTES NFR BLD AUTO: 0.2 % (ref 0–0.5)
LDLC SERPL CALC-MCNC: 113 MG/DL (ref 0–100)
LDLC/HDLC SERPL: 1.64 {RATIO}
LYMPHOCYTES # BLD AUTO: 3.13 10*3/MM3 (ref 0.7–3.1)
LYMPHOCYTES NFR BLD AUTO: 33.3 % (ref 19.6–45.3)
MCH RBC QN AUTO: 29.2 PG (ref 26.6–33)
MCHC RBC AUTO-ENTMCNC: 33.7 G/DL (ref 31.5–35.7)
MCV RBC AUTO: 86.8 FL (ref 79–97)
MONOCYTES # BLD AUTO: 0.85 10*3/MM3 (ref 0.1–0.9)
MONOCYTES NFR BLD AUTO: 9.1 % (ref 5–12)
NEUTROPHILS NFR BLD AUTO: 5.16 10*3/MM3 (ref 1.7–7)
NEUTROPHILS NFR BLD AUTO: 55 % (ref 42.7–76)
NRBC BLD AUTO-RTO: 0 /100 WBC (ref 0–0.2)
PLATELET # BLD AUTO: 298 10*3/MM3 (ref 140–450)
PMV BLD AUTO: 9.5 FL (ref 6–12)
POTASSIUM SERPL-SCNC: 4.6 MMOL/L (ref 3.5–5.2)
PROT SERPL-MCNC: 7.5 G/DL (ref 6–8.5)
RBC # BLD AUTO: 4.79 10*6/MM3 (ref 3.77–5.28)
SODIUM SERPL-SCNC: 137 MMOL/L (ref 136–145)
T4 FREE SERPL-MCNC: 1.12 NG/DL (ref 0.92–1.68)
TRIGL SERPL-MCNC: 61 MG/DL (ref 0–150)
TSH SERPL DL<=0.05 MIU/L-ACNC: 4.45 UIU/ML (ref 0.27–4.2)
VLDLC SERPL-MCNC: 11 MG/DL (ref 5–40)
WBC NRBC COR # BLD AUTO: 9.39 10*3/MM3 (ref 3.4–10.8)

## 2025-04-08 PROCEDURE — 80053 COMPREHEN METABOLIC PANEL: CPT

## 2025-04-08 PROCEDURE — 84443 ASSAY THYROID STIM HORMONE: CPT

## 2025-04-08 PROCEDURE — 80061 LIPID PANEL: CPT

## 2025-04-08 PROCEDURE — 85025 COMPLETE CBC W/AUTO DIFF WBC: CPT

## 2025-04-08 PROCEDURE — 83036 HEMOGLOBIN GLYCOSYLATED A1C: CPT

## 2025-04-08 PROCEDURE — 84439 ASSAY OF FREE THYROXINE: CPT

## 2025-04-08 PROCEDURE — 36415 COLL VENOUS BLD VENIPUNCTURE: CPT

## 2025-04-08 RX ORDER — NAPROXEN 500 MG/1
500 TABLET ORAL 2 TIMES DAILY
Qty: 60 TABLET | Refills: 0 | Status: SHIPPED | OUTPATIENT
Start: 2025-04-08

## 2025-04-18 ENCOUNTER — OFFICE VISIT (OUTPATIENT)
Dept: FAMILY MEDICINE CLINIC | Facility: CLINIC | Age: 80
End: 2025-04-18
Payer: MEDICARE

## 2025-04-18 VITALS
BODY MASS INDEX: 38.28 KG/M2 | SYSTOLIC BLOOD PRESSURE: 122 MMHG | RESPIRATION RATE: 16 BRPM | WEIGHT: 208 LBS | HEIGHT: 62 IN | TEMPERATURE: 98 F | OXYGEN SATURATION: 97 % | HEART RATE: 85 BPM | DIASTOLIC BLOOD PRESSURE: 64 MMHG

## 2025-04-18 DIAGNOSIS — R73.03 PREDIABETES: Primary | ICD-10-CM

## 2025-04-18 DIAGNOSIS — N18.31 STAGE 3A CHRONIC KIDNEY DISEASE: ICD-10-CM

## 2025-04-18 DIAGNOSIS — M79.10 MUSCLE PAIN: ICD-10-CM

## 2025-04-18 DIAGNOSIS — E03.9 ACQUIRED HYPOTHYROIDISM: ICD-10-CM

## 2025-04-18 LAB
ALBUMIN UR-MCNC: 5.8 MG/DL
CREAT UR-MCNC: 43.9 MG/DL
MICROALBUMIN/CREAT UR: 132.1 MG/G (ref 0–29)

## 2025-04-18 PROCEDURE — 1159F MED LIST DOCD IN RCRD: CPT | Performed by: STUDENT IN AN ORGANIZED HEALTH CARE EDUCATION/TRAINING PROGRAM

## 2025-04-18 PROCEDURE — 82570 ASSAY OF URINE CREATININE: CPT | Performed by: STUDENT IN AN ORGANIZED HEALTH CARE EDUCATION/TRAINING PROGRAM

## 2025-04-18 PROCEDURE — G0439 PPPS, SUBSEQ VISIT: HCPCS | Performed by: STUDENT IN AN ORGANIZED HEALTH CARE EDUCATION/TRAINING PROGRAM

## 2025-04-18 PROCEDURE — 82043 UR ALBUMIN QUANTITATIVE: CPT | Performed by: STUDENT IN AN ORGANIZED HEALTH CARE EDUCATION/TRAINING PROGRAM

## 2025-04-18 PROCEDURE — 1160F RVW MEDS BY RX/DR IN RCRD: CPT | Performed by: STUDENT IN AN ORGANIZED HEALTH CARE EDUCATION/TRAINING PROGRAM

## 2025-04-18 PROCEDURE — 1170F FXNL STATUS ASSESSED: CPT | Performed by: STUDENT IN AN ORGANIZED HEALTH CARE EDUCATION/TRAINING PROGRAM

## 2025-04-18 PROCEDURE — 1126F AMNT PAIN NOTED NONE PRSNT: CPT | Performed by: STUDENT IN AN ORGANIZED HEALTH CARE EDUCATION/TRAINING PROGRAM

## 2025-04-18 RX ORDER — CYCLOBENZAPRINE HCL 10 MG
10 TABLET ORAL NIGHTLY PRN
Qty: 90 TABLET | Refills: 0 | Status: SHIPPED | OUTPATIENT
Start: 2025-04-18

## 2025-04-18 NOTE — PROGRESS NOTES
Subjective   The ABCs of the Annual Wellness Visit  Medicare Wellness Visit      Roopa Danielle is a 79 y.o. patient who presents for a Medicare Wellness Visit.    The following portions of the patient's history were reviewed and   updated as appropriate: allergies, current medications, past family history, past medical history, past social history, past surgical history, and problem list.    Compared to one year ago, the patient's physical   health is the same.  Compared to one year ago, the patient's mental   health is the same.    Recent Hospitalizations:  She was not admitted to the hospital during the last year.     Current Medical Providers:  Patient Care Team:  Lisha Watkins MD as PCP - General (Family Medicine)  Orthopedic    Outpatient Medications Prior to Visit   Medication Sig Dispense Refill   • amLODIPine (NORVASC) 10 MG tablet Take 1 tablet by mouth once daily 90 tablet 0   • levothyroxine (SYNTHROID, LEVOTHROID) 75 MCG tablet Take 1 tablet by mouth Daily. 90 tablet 1   • naproxen (NAPROSYN) 500 MG tablet Take 1 tablet by mouth twice daily 60 tablet 0   • HYDROcodone-acetaminophen (Norco) 7.5-325 MG per tablet Take 1 tablet by mouth Every 4 (Four) Hours As Needed for Moderate Pain. (Patient not taking: Reported on 4/18/2025) 12 tablet 0     No facility-administered medications prior to visit.     Opioid medication/s are on active medication list.  and I have evaluated her active treatment plan and pain score trends (see table).  Vitals:    04/18/25 1300   PainSc: 0-No pain     I have reviewed the chart for potential of high risk medication and harmful drug interactions in the elderly.        Aspirin is not on active medication list.  Aspirin use is not indicated based on review of current medical condition/s. Risk of harm outweighs potential benefits.  .    Patient Active Problem List   Diagnosis   • Kidney stone   • Carpal tunnel syndrome of left wrist   • Carpal tunnel syndrome on left     Advance  "Care Planning Advance Directive is not on file.  ACP discussion was held with the patient during this visit. Patient does not have an advance directive, information provided.      Patient has been erroneously marked as diabetic. Based on the available clinical information, she does not have diabetes and should therefore be excluded from diabetic health maintenance and quality measures for the remainder of the reporting period.       Objective   Vitals:    25 1300   BP: 122/64   Pulse: 85   Resp: 16   Temp: 98 °F (36.7 °C)   TempSrc: Temporal   SpO2: 97%   Weight: 94.3 kg (208 lb)   Height: 157.5 cm (62\")   PainSc: 0-No pain       Estimated body mass index is 38.04 kg/m² as calculated from the following:    Height as of this encounter: 157.5 cm (62\").    Weight as of this encounter: 94.3 kg (208 lb).                Does the patient have evidence of cognitive impairment? No  Lab Results   Component Value Date    TRIG 61 2025    HDL 68 (H) 2025     (H) 2025    VLDL 11 2025    HGBA1C 5.60 2025                                                                                               Health  Risk Assessment    Smoking Status:  Social History     Tobacco Use   Smoking Status Never   • Passive exposure: Past   Smokeless Tobacco Never   Tobacco Comments    Used to be exposed to smokers     Alcohol Consumption:  Social History     Substance and Sexual Activity   Alcohol Use Never       Fall Risk Screen  STEADI Fall Risk Assessment was completed, and patient is at LOW risk for falls.Assessment completed on:2025    Depression Screening   Little interest or pleasure in doing things? Not at all   Feeling down, depressed, or hopeless? Not at all   PHQ-2 Total Score 0      Health Habits and Functional and Cognitive Screenin/18/2025     1:00 PM   Functional & Cognitive Status   Do you have difficulty preparing food and eating? No   Do you have difficulty bathing yourself, " getting dressed or grooming yourself? No   Do you have difficulty using the toilet? No   Do you have difficulty moving around from place to place? No   Do you have trouble with steps or getting out of a bed or a chair? No   Current Diet Unhealthy Diet   Dental Exam Not up to date   Eye Exam Not up to date   Exercise (times per week) 0 times per week   Current Exercises Include House Cleaning;Yard Work   Do you need help using the phone?  No   Are you deaf or do you have serious difficulty hearing?  No   Do you need help to go to places out of walking distance? No   Do you need help shopping? No   Do you need help preparing meals?  No   Do you need help with housework?  No   Do you need help with laundry? No   Do you need help taking your medications? No   Do you need help managing money? No   Do you ever drive or ride in a car without wearing a seat belt? No   Have you felt unusual stress, anger or loneliness in the last month? No   Who do you live with? Spouse   If you need help, do you have trouble finding someone available to you? No   Have you been bothered in the last four weeks by sexual problems? No   Do you have difficulty concentrating, remembering or making decisions? No           Age-appropriate Screening Schedule:  Refer to the list below for future screening recommendations based on patient's age, sex and/or medical conditions. Orders for these recommended tests are listed in the plan section. The patient has been provided with a written plan.    Health Maintenance List  Health Maintenance   Topic Date Due   • DIABETIC FOOT EXAM  Never done   • DIABETIC EYE EXAM  Never done   • URINE MICROALBUMIN-CREATININE RATIO (uACR)  Never done   • COVID-19 Vaccine (3 - 2024-25 season) 05/02/2025 (Originally 9/1/2024)   • TDAP/TD VACCINES (1 - Tdap) 10/15/2025 (Originally [birth year]16/1964)   • ZOSTER VACCINE (1 of 2) 10/15/2025 (Originally 10/16/1996)   • Pneumococcal Vaccine 50+ (1 of 2 - PCV) 10/18/2025 (Originally  [birth year]16/1964)   • RSV Vaccine - Adults (1 - 1-dose 75+ series) 04/18/2026 (Originally 10/16/2021)   • INFLUENZA VACCINE  07/01/2025   • HEMOGLOBIN A1C  10/08/2025   • DXA SCAN  02/06/2026   • ANNUAL WELLNESS VISIT  04/18/2026   • HEPATITIS C SCREENING  Completed   • MAMMOGRAM  Discontinued                                                                                                                                                CMS Preventative Services Quick Reference  Risk Factors Identified During Encounter  Dental Screening Recommended  Vision Screening Recommended    The above risks/problems have been discussed with the patient.  Pertinent information has been shared with the patient in the After Visit Summary.  An After Visit Summary and PPPS were made available to the patient.    Follow Up:   Next Medicare Wellness visit to be scheduled in 1 year.     Assessment & Plan  Prediabetes    Orders:  •  Microalbumin / Creatinine Urine Ratio - Urine, Clean Catch  •  TSH Rfx On Abnormal To Free T4; Future  •  CBC & Differential; Future  •  Comprehensive Metabolic Panel; Future    Stage 3a chronic kidney disease      Orders:  •  TSH Rfx On Abnormal To Free T4; Future  •  CBC & Differential; Future  •  Comprehensive Metabolic Panel; Future    Acquired hypothyroidism    Orders:  •  TSH Rfx On Abnormal To Free T4; Future  •  CBC & Differential; Future  •  Comprehensive Metabolic Panel; Future    Muscle pain    Orders:  •  cyclobenzaprine (FLEXERIL) 10 MG tablet; Take 1 tablet by mouth At Night As Needed for Muscle Spasms.         Follow Up:   Return in about 6 months (around 10/18/2025) for Recheck.

## 2025-04-28 ENCOUNTER — OFFICE VISIT (OUTPATIENT)
Dept: FAMILY MEDICINE CLINIC | Facility: CLINIC | Age: 80
End: 2025-04-28
Payer: MEDICARE

## 2025-04-28 VITALS
HEART RATE: 87 BPM | TEMPERATURE: 98.3 F | DIASTOLIC BLOOD PRESSURE: 80 MMHG | OXYGEN SATURATION: 94 % | HEIGHT: 62 IN | SYSTOLIC BLOOD PRESSURE: 124 MMHG | WEIGHT: 210 LBS | BODY MASS INDEX: 38.64 KG/M2

## 2025-04-28 DIAGNOSIS — R10.9 ABDOMINAL PAIN, UNSPECIFIED ABDOMINAL LOCATION: ICD-10-CM

## 2025-04-28 DIAGNOSIS — R39.198 DIFFICULTY URINATING: Primary | ICD-10-CM

## 2025-04-28 NOTE — PROGRESS NOTES
"Chief Complaint  Difficulty Urinating and Abdominal Pain (And pressure on right side/)    Subjective      Roopa Danielle is a 79 y.o. female who presents to [facility]     History of Present Illness  The patient presents for evaluation of urinary retention.    She has been experiencing difficulty urinating for the past 2 to 3 days, with her last urination occurring this morning, producing a minimal amount of urine. She reports that she drank a bottle of water today and feels that she voided at least that amount.. She reports no pain during urination or increased frequency. Her symptoms are more pronounced at night, often disrupting her sleep. Over the past 3 days, she estimates her total urine output to be equivalent to a can of Coke. She consumes a lot of tea and Sprite. She also reports swelling in her legs, which she considers normal. Her urine is clear in color. She experiences urinary incontinence when coughing. She has a history of kidney infections. She has had episodes of hematuria in the past but not recently. She has been engaging in physical activities such as gardening and moving planters but has not noticed any changes in her urination pattern.     Supplemental Information  She was informed that her kidney function was suboptimal during a recent visit. She underwent lab tests last week.         Objective   Vital Signs:   Vitals:    04/28/25 1321   BP: 124/80   BP Location: Left arm   Patient Position: Sitting   Cuff Size: Adult   Pulse: 87   Temp: 98.3 °F (36.8 °C)   SpO2: 94%   Weight: 95.3 kg (210 lb)   Height: 157.5 cm (62.01\")     Body mass index is 38.4 kg/m².    Wt Readings from Last 3 Encounters:   04/28/25 95.3 kg (210 lb)   04/18/25 94.3 kg (208 lb)   10/18/24 94.3 kg (208 lb)     BP Readings from Last 3 Encounters:   04/28/25 124/80   04/18/25 122/64   10/18/24 142/70       Health Maintenance   Topic Date Due    COVID-19 Vaccine (3 - 2024-25 season) 05/02/2025 " (Originally 9/1/2024)    TDAP/TD VACCINES (1 - Tdap) 10/15/2025 (Originally 10/16/1964)    ZOSTER VACCINE (1 of 2) 10/15/2025 (Originally 10/16/1996)    Pneumococcal Vaccine 50+ (1 of 2 - PCV) 10/18/2025 (Originally 10/16/1965)    RSV Vaccine - Adults (1 - 1-dose 75+ series) 04/18/2026 (Originally 10/16/2021)    INFLUENZA VACCINE  07/01/2025    DXA SCAN  02/06/2026    ANNUAL WELLNESS VISIT  04/18/2026    HEPATITIS C SCREENING  Completed    MAMMOGRAM  Discontinued    HEMOGLOBIN A1C  Discontinued    URINE MICROALBUMIN-CREATININE RATIO (uACR)  Discontinued       No Images in the past 120 days found..     Physical Exam  Vitals and nursing note reviewed.   HENT:      Head: Normocephalic and atraumatic.      Nose: Nose normal.   Eyes:      Conjunctiva/sclera: Conjunctivae normal.   Cardiovascular:      Rate and Rhythm: Normal rate and regular rhythm.      Pulses: Normal pulses.      Heart sounds: Normal heart sounds.   Pulmonary:      Effort: Pulmonary effort is normal.      Breath sounds: Normal breath sounds.   Abdominal:      General: Bowel sounds are normal. There is no distension.      Palpations: Abdomen is soft.      Tenderness: There is no abdominal tenderness. There is no right CVA tenderness, left CVA tenderness, guarding or rebound.      Hernia: No hernia is present.   Musculoskeletal:         General: Normal range of motion.      Cervical back: Normal range of motion.   Skin:     General: Skin is warm and dry.   Neurological:      Mental Status: She is alert and oriented to person, place, and time.   Psychiatric:         Mood and Affect: Mood normal.         Behavior: Behavior normal.          Physical Exam  There is mild tenderness in the abdomen.    Vital Signs  Vitals are normal.       Result Review :  The following data was reviewed by: TONI Jean on 04/28/2025:         Procedures          ASSESSMENT/PLAN  Diagnoses and all orders for this visit:    1. Difficulty urinating  (Primary)  Comments:  UA ordered, pt with plans to return sample in morning after first void, unable to void in office. If symptoms become severe report to ED for further eval.    2. Abdominal discomfort in right upper quadrant  Comments:  UA ordered, pt with plans to return sample in morning after first void, unable to void in office. If symptoms become severe report to ED for further eval.        Assessment & Plan  1. Urinary retention.  Her kidney function had declined slightly 2 weeks ago, and she is currently experiencing discomfort in the area and difficulty urinating. Given her history of kidney infections, there is a possibility that an infection could be the underlying cause of her current symptoms. However, she does not have flank pain, fever, dysuria, frequency.  She is advised to increase her fluid intake and provide a urine sample for analysis. A urine culture will be sent for further investigation once urine sample obtained. If symptoms worsen she was instrructed to seek treatment at the Emergency Department for further assessment.                Roopa Danielle  reports that she has never smoked. She has been exposed to tobacco smoke. She has never used smokeless tobacco.            FOLLOW UP  Return for Next scheduled follow up.  Patient was given instructions and counseling regarding her condition or for health maintenance advice. Please see specific information pulled into the AVS if appropriate.       TONI Jean  04/29/25  09:25 EDT    Patient or patient representative verbalized consent for the use of Ambient Listening during the visit with  TONI Jean for chart documentation. 4/29/2025  09:23 EDT

## 2025-04-29 ENCOUNTER — HOSPITAL ENCOUNTER (OUTPATIENT)
Dept: CT IMAGING | Facility: HOSPITAL | Age: 80
Discharge: HOME | End: 2025-04-29
Payer: MEDICARE

## 2025-04-29 ENCOUNTER — CLINICAL SUPPORT (OUTPATIENT)
Dept: FAMILY MEDICINE CLINIC | Facility: CLINIC | Age: 80
End: 2025-04-29
Payer: MEDICARE

## 2025-04-29 DIAGNOSIS — R10.9 ABDOMINAL PAIN, UNSPECIFIED ABDOMINAL LOCATION: ICD-10-CM

## 2025-04-29 DIAGNOSIS — R39.198 DIFFICULTY URINATING: ICD-10-CM

## 2025-04-29 DIAGNOSIS — R30.0 DYSURIA: Primary | ICD-10-CM

## 2025-04-29 LAB
BILIRUB BLD-MCNC: NEGATIVE MG/DL
CLARITY, POC: CLEAR
COLOR UR: YELLOW
EXPIRATION DATE: ABNORMAL
GLUCOSE UR STRIP-MCNC: NEGATIVE MG/DL
KETONES UR QL: NEGATIVE
LEUKOCYTE EST, POC: ABNORMAL
Lab: ABNORMAL
NITRITE UR-MCNC: NEGATIVE MG/ML
PH UR: 6 [PH] (ref 5–8)
PROT UR STRIP-MCNC: NEGATIVE MG/DL
RBC # UR STRIP: ABNORMAL /UL
SP GR UR: 1.01 (ref 1–1.03)
UROBILINOGEN UR QL: ABNORMAL

## 2025-04-29 PROCEDURE — 74176 CT ABD & PELVIS W/O CONTRAST: CPT

## 2025-04-29 PROCEDURE — 81003 URINALYSIS AUTO W/O SCOPE: CPT | Performed by: STUDENT IN AN ORGANIZED HEALTH CARE EDUCATION/TRAINING PROGRAM

## 2025-04-29 PROCEDURE — 87086 URINE CULTURE/COLONY COUNT: CPT

## 2025-05-01 LAB — BACTERIA SPEC AEROBE CULT: NORMAL

## 2025-05-09 DIAGNOSIS — M19.042 OSTEOARTHRITIS OF LEFT HAND, UNSPECIFIED OSTEOARTHRITIS TYPE: ICD-10-CM

## 2025-05-09 RX ORDER — NAPROXEN 500 MG/1
500 TABLET ORAL 2 TIMES DAILY
Qty: 60 TABLET | Refills: 0 | Status: SHIPPED | OUTPATIENT
Start: 2025-05-09

## 2025-06-09 DIAGNOSIS — M19.042 OSTEOARTHRITIS OF LEFT HAND, UNSPECIFIED OSTEOARTHRITIS TYPE: ICD-10-CM

## 2025-06-09 RX ORDER — NAPROXEN 500 MG/1
500 TABLET ORAL 2 TIMES DAILY
Qty: 60 TABLET | Refills: 0 | Status: SHIPPED | OUTPATIENT
Start: 2025-06-09

## 2025-06-26 DIAGNOSIS — I10 PRIMARY HYPERTENSION: ICD-10-CM

## 2025-06-26 RX ORDER — AMLODIPINE BESYLATE 10 MG/1
10 TABLET ORAL DAILY
Qty: 90 TABLET | Refills: 0 | Status: SHIPPED | OUTPATIENT
Start: 2025-06-26

## 2025-07-01 ENCOUNTER — READMISSION MANAGEMENT (OUTPATIENT)
Dept: CALL CENTER | Facility: HOSPITAL | Age: 80
End: 2025-07-01
Payer: MEDICARE

## 2025-07-01 NOTE — OUTREACH NOTE
Prep Survey      Flowsheet Row Responses   [facility] facility patient discharged from? Non-BH   Is LACE score < 7 ? Non-BH Discharge   Eligibility [facility]   Date of Admission 06/25/25   Date of Discharge 06/30/25   Discharge Disposition Home or Self Care   Discharge diagnosis Generalized abdominal pain   Does the patient have one of the following disease processes/diagnoses(primary or secondary)? Other   Prep survey completed? Yes            ESTHER CALDERON - Registered Nurse

## 2025-07-02 ENCOUNTER — TRANSITIONAL CARE MANAGEMENT TELEPHONE ENCOUNTER (OUTPATIENT)
Dept: CALL CENTER | Facility: HOSPITAL | Age: 80
End: 2025-07-02
Payer: MEDICARE

## 2025-07-02 NOTE — OUTREACH NOTE
Call Center TCM Note      Flowsheet Row Responses   [facility] patient discharged from? Non-  [[facility]]   Does the patient have one of the following disease processes/diagnoses(primary or secondary)? Other   TCM attempt successful? Yes  [vr for Yonas,  and Anjali,dtr]   Call start time 1117   Call end time 1120   Discharge diagnosis Generalized abdominal pain/Tickborne Illness   Meds reviewed with patient/caregiver? Yes   Is the patient having any side effects they believe may be caused by any medication additions or changes? No   Does the patient have all medications ordered at discharge? Yes   Prescription comments Doxycycline added for tickborne illness   Is the patient taking all medications as directed (includes completed medication regime)? Yes   Comments Hospital f/u 7/9/25@1030am (appt in place at time of call)   Does the patient have an appointment with their PCP within 7-14 days of discharge? Yes   Nursing Interventions Confirmed date/time of appointment   Has home health visited the patient within 72 hours of discharge? N/A   Psychosocial issues? No   Did the patient receive a copy of their discharge instructions? Yes   Nursing interventions Reviewed instructions with patient   What is the patient's perception of their health status since discharge? Improving  [Pt reports she is doing better.  No fever, no abdominal pain, keeping hydrated.  Reports taking atbs and is following alpha gal diet as advised. No questions]   Is the patient/caregiver able to teach back signs and symptoms related to disease process for when to call PCP? Yes   Is the patient/caregiver able to teach back signs and symptoms related to disease process for when to call 911? Yes   TCM call completed? Yes   Call end time 1120            CHRISTIAN CALDERON - Registered Nurse    7/2/2025, 11:22 EDT

## 2025-07-08 DIAGNOSIS — M19.042 OSTEOARTHRITIS OF LEFT HAND, UNSPECIFIED OSTEOARTHRITIS TYPE: ICD-10-CM

## 2025-07-08 RX ORDER — NAPROXEN 500 MG/1
500 TABLET ORAL 2 TIMES DAILY
Qty: 60 TABLET | Refills: 0 | Status: SHIPPED | OUTPATIENT
Start: 2025-07-08 | End: 2025-07-09

## 2025-07-09 ENCOUNTER — OFFICE VISIT (OUTPATIENT)
Dept: FAMILY MEDICINE CLINIC | Facility: CLINIC | Age: 80
End: 2025-07-09
Payer: MEDICARE

## 2025-07-09 VITALS
HEIGHT: 62 IN | SYSTOLIC BLOOD PRESSURE: 134 MMHG | OXYGEN SATURATION: 96 % | WEIGHT: 204.4 LBS | TEMPERATURE: 97 F | BODY MASS INDEX: 37.61 KG/M2 | HEART RATE: 77 BPM | RESPIRATION RATE: 20 BRPM | DIASTOLIC BLOOD PRESSURE: 64 MMHG

## 2025-07-09 DIAGNOSIS — J98.8 CONGESTION OF RESPIRATORY TRACT: ICD-10-CM

## 2025-07-09 DIAGNOSIS — R91.1 PULMONARY NODULE: ICD-10-CM

## 2025-07-09 DIAGNOSIS — R78.81 BACTEREMIA: ICD-10-CM

## 2025-07-09 DIAGNOSIS — Z91.018 ALLERGY TO ALPHA-GAL: ICD-10-CM
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
## 2025-07-09 DIAGNOSIS — R31.1 BENIGN ESSENTIAL MICROSCOPIC HEMATURIA: ICD-10-CM

## 2025-07-09 DIAGNOSIS — Z09 HOSPITAL DISCHARGE FOLLOW-UP: Primary | ICD-10-CM

## 2025-07-09 RX ORDER — ALBUTEROL SULFATE 90 UG/1
2 INHALANT RESPIRATORY (INHALATION) EVERY 4 HOURS PRN
Qty: 18 G | Refills: 11 | Status: SHIPPED | OUTPATIENT
Start: 2025-07-09

## 2025-07-09 NOTE — PROGRESS NOTES
"Chief Complaint  Transitional Care Management ([facility]  - Abdominal pain and fever/06/25-06-30/25)    Following up on hospital discharge, discharged on 6/30/2025    Mary Danielle is a 79 y.o. female who presents to [facility]    79 years old comes to the clinic today to follow-up on hospital discharge.    Patient was hospitalized for abdominal pain and fever.  Found to have bacteremia/tick bite fever.  Patient was treated in the hospital, had chest CT which showed small pulmonary nodule without any PE.  Patient had CT abdominal and pelvis which showed no abnormal findings.  Patient was discharged home on doxycycline.  Patient did have some microscopic hematuria which is more chronic due to possibly kidney stone.  Patient was found to have positive alpha gal    Patient has been doing well since discharge.  On doxycycline.    Reports no fever/abdominal symptoms or any nausea/vomiting.  Patient does report occasional coughing and congestion at nighttime,  is requesting albuterol.  No acute changes    Objective   Vital Signs:   Vitals:    07/09/25 1045   BP: 134/64   BP Location: Left arm   Patient Position: Sitting   Cuff Size: Large Adult   Pulse: 77   Resp: 20   Temp: 97 °F (36.1 °C)   TempSrc: Temporal   SpO2: 96%   Weight: 92.7 kg (204 lb 6.4 oz)   Height: 157.5 cm (62.01\")      Body mass index is 37.38 kg/m².   Wt Readings from Last 3 Encounters:   07/09/25 92.7 kg (204 lb 6.4 oz)   04/28/25 95.3 kg (210 lb)   04/18/25 94.3 kg (208 lb)      BP Readings from Last 3 Encounters:   07/09/25 134/64   04/28/25 124/80   04/18/25 122/64        Patient Care Team:  Lisha Watkins MD as PCP - General (Family Medicine)     Physical Exam  Vitals reviewed.   Constitutional:       Appearance: Normal appearance. She is well-developed.   HENT:      Head: Normocephalic and atraumatic.      Right Ear: External ear normal.      Left Ear: External ear normal.      Mouth/Throat: "      Pharynx: No oropharyngeal exudate.   Eyes:      Conjunctiva/sclera: Conjunctivae normal.      Pupils: Pupils are equal, round, and reactive to light.   Cardiovascular:      Rate and Rhythm: Normal rate and regular rhythm.      Heart sounds: No murmur heard.     No friction rub. No gallop.   Pulmonary:      Effort: Pulmonary effort is normal.      Breath sounds: Normal breath sounds. No wheezing or rhonchi.   Abdominal:      General: Bowel sounds are normal. There is no distension.      Palpations: Abdomen is soft.      Tenderness: There is no abdominal tenderness.   Skin:     General: Skin is warm and dry.   Neurological:      Mental Status: She is alert and oriented to person, place, and time.      Cranial Nerves: No cranial nerve deficit.   Psychiatric:         Mood and Affect: Mood and affect normal.         Behavior: Behavior normal.         Thought Content: Thought content normal.         Judgment: Judgment normal.                            Assessment and Plan   Diagnoses and all orders for this visit:    1. Hospital discharge follow-up (Primary)    2. Tick bite, unspecified site, sequela  Comments:  Finish doxycycline    3. Benign essential microscopic hematuria  Comments:  Chronic.  Likely due to kidney stone.  Repeat urine in 3 months    4. Bacteremia  Comments:  Improved    5. Allergy to alpha-gal    6. Pulmonary nodule  Comments:  follow-up chest CT in 1 year  Orders:  -     CT Chest Without Contrast; Future    7. Congestion of respiratory tract  Comments:  Albuterol  Orders:  -     albuterol sulfate  (90 Base) MCG/ACT inhaler; Inhale 2 puffs Every 4 (Four) Hours As Needed for Wheezing or Shortness of Air.  Dispense: 18 g; Refill: 11          Tobacco Use: Low Risk  (7/9/2025)    Patient History     Smoking Tobacco Use: Never     Smokeless Tobacco Use: Never     Passive Exposure: Past            Follow Up   Return if symptoms worsen or fail to improve.  Patient was given instructions and  counseling regarding her condition or for health maintenance advice. Please see specific information pulled into the AVS if appropriate.

## 2025-07-19 DIAGNOSIS — E03.9 ACQUIRED HYPOTHYROIDISM: ICD-10-CM

## 2025-07-21 RX ORDER — LEVOTHYROXINE SODIUM 75 UG/1
75 TABLET ORAL DAILY
Qty: 90 TABLET | Refills: 0 | Status: SHIPPED | OUTPATIENT
Start: 2025-07-21

## 2025-07-21 NOTE — TELEPHONE ENCOUNTER
Upcoming Appts  With Family Medicine (Lisha Watkins MD)  10/21/2025 at 1:00 PM  Last Office Visit - This Dept  7/9/2025 Lisha Watkins MD

## (undated) DEVICE — BNDG ELAS ECON W/CLIP 4IN 5YD LF STRL

## (undated) DEVICE — INTENDED FOR TISSUE SEPARATION, AND OTHER PROCEDURES THAT REQUIRE A SHARP SURGICAL BLADE TO PUNCTURE OR CUT.: Brand: BARD-PARKER ® CARBON RIB-BACK BLADES

## (undated) DEVICE — HYPODERMIC SAFETY NEEDLE: Brand: MONOJECT

## (undated) DEVICE — STERILE POLYISOPRENE POWDER-FREE SURGICAL GLOVES: Brand: PROTEXIS

## (undated) DEVICE — ENCORE® LATEX ORTHO SIZE 8, STERILE LATEX POWDER-FREE SURGICAL GLOVE: Brand: ENCORE

## (undated) DEVICE — UNDERCAST PADDING: Brand: DEROYAL

## (undated) DEVICE — SUT ETHLN 4/0 FS2 18IN 662H

## (undated) DEVICE — DRESSING,GAUZE,XEROFORM,CURAD,1"X8",ST: Brand: CURAD

## (undated) DEVICE — GLOVE,SURG,SENSICARE,ALOE,LF,PF,7: Brand: MEDLINE

## (undated) DEVICE — BLD OPTH BEAVER/MINIBLADE STR 180DEG DBL/BVL SS

## (undated) DEVICE — GAUZE,SPONGE,4"X4",16PLY,STRL,LF,10/TRAY: Brand: MEDLINE

## (undated) DEVICE — GLOVE,SURG,SENSICARE SLT,LF,PF,7: Brand: MEDLINE

## (undated) DEVICE — EXTREMITY-LF: Brand: MEDLINE INDUSTRIES, INC.

## (undated) DEVICE — BNDG ESMARK 4IN 12FT LF STRL BLU